# Patient Record
Sex: FEMALE | URBAN - METROPOLITAN AREA
[De-identification: names, ages, dates, MRNs, and addresses within clinical notes are randomized per-mention and may not be internally consistent; named-entity substitution may affect disease eponyms.]

---

## 2023-03-29 ENCOUNTER — CLINICAL DOCUMENTATION (OUTPATIENT)
Dept: ONCOLOGY | Age: 23
End: 2023-03-29

## 2023-03-29 ENCOUNTER — CLINICAL DOCUMENTATION (OUTPATIENT)
Facility: HOSPITAL | Age: 23
End: 2023-03-29

## 2023-03-29 NOTE — PROGRESS NOTES
I spoke [place] Courser regarding insurance options. I advised the patient [employer] is out-of-network for [employer]. Patient stated she will pay out of pocket regarding expenses for my medical treatment and services.

## 2023-04-05 DIAGNOSIS — D64.9 ANEMIA, UNSPECIFIED TYPE: Primary | ICD-10-CM

## 2023-04-05 DIAGNOSIS — Z13.0 SCREENING, IRON DEFICIENCY ANEMIA: ICD-10-CM

## 2023-04-06 ENCOUNTER — CLINICAL DOCUMENTATION (OUTPATIENT)
Dept: ONCOLOGY | Age: 23
End: 2023-04-06

## 2023-04-06 ENCOUNTER — HOSPITAL ENCOUNTER (OUTPATIENT)
Dept: LAB | Age: 23
Discharge: HOME | End: 2023-04-06

## 2023-04-06 DIAGNOSIS — Z13.0 SCREENING, IRON DEFICIENCY ANEMIA: ICD-10-CM

## 2023-04-06 DIAGNOSIS — D64.9 ANEMIA, UNSPECIFIED TYPE: ICD-10-CM

## 2023-04-06 LAB
ALBUMIN SERPL-MCNC: 4.2 G/DL (ref 3.5–5)
ALBUMIN/GLOB SERPL: 1.4 (ref 0.4–1.6)
ALP SERPL-CCNC: 66 U/L (ref 50–136)
ALT SERPL-CCNC: 75 U/L (ref 12–65)
ANION GAP SERPL CALC-SCNC: 6 MMOL/L (ref 2–11)
APPEARANCE UR: ABNORMAL
AST SERPL-CCNC: 184 U/L (ref 15–37)
BACTERIA URNS QL MICRO: ABNORMAL /HPF
BASOPHILS # BLD: 0 K/UL (ref 0–0.2)
BASOPHILS NFR BLD: 0 % (ref 0–2)
BILIRUB SERPL-MCNC: 1 MG/DL (ref 0.2–1.1)
BILIRUB UR QL: ABNORMAL
BUN SERPL-MCNC: 9 MG/DL (ref 6–23)
CALCIUM SERPL-MCNC: 8.9 MG/DL (ref 8.3–10.4)
CASTS URNS QL MICRO: ABNORMAL /LPF
CEA SERPL-MCNC: 3.8 NG/ML (ref 0–3)
CHLORIDE SERPL-SCNC: 106 MMOL/L (ref 101–110)
CO2 SERPL-SCNC: 28 MMOL/L (ref 21–32)
COLOR UR: YELLOW
CREAT SERPL-MCNC: 0.5 MG/DL (ref 0.6–1)
CRYSTALS URNS QL MICRO: 0 /LPF
DAT POLY-SP REAG RBC QL: NORMAL
DIFFERENTIAL METHOD BLD: ABNORMAL
EOSINOPHIL # BLD: 0.1 K/UL (ref 0–0.8)
EOSINOPHIL NFR BLD: 2 % (ref 0.5–7.8)
EPI CELLS #/AREA URNS HPF: ABNORMAL /HPF
ERYTHROCYTE [SEDIMENTATION RATE] IN BLOOD: 4 MM/HR (ref 0–20)
FERRITIN SERPL-MCNC: 599 NG/ML (ref 8–388)
FOLATE SERPL-MCNC: 5.9 NG/ML (ref 3.1–17.5)
GLOBULIN SER CALC-MCNC: 3 G/DL (ref 2.8–4.5)
GLUCOSE SERPL-MCNC: 83 MG/DL (ref 65–100)
GLUCOSE UR STRIP.AUTO-MCNC: NEGATIVE MG/DL
HCT VFR BLD AUTO: 21.8 % (ref 35.8–46.3)
HGB BLD-MCNC: 7.4 G/DL (ref 11.7–15.4)
HGB RETIC QN AUTO: 39 PG (ref 29–35)
HGB UR QL STRIP: ABNORMAL
IMM GRANULOCYTES # BLD AUTO: 0.1 K/UL (ref 0–0.5)
IMM GRANULOCYTES NFR BLD AUTO: 1 % (ref 0–5)
IMM RETICS NFR: 7.4 % (ref 3–15.9)
IRON SATN MFR SERPL: 71 %
IRON SERPL-MCNC: 157 UG/DL (ref 35–150)
KETONES UR QL STRIP.AUTO: NEGATIVE MG/DL
LDH SERPL L TO P-CCNC: >4000 U/L (ref 100–190)
LEUKOCYTE ESTERASE UR QL STRIP.AUTO: NEGATIVE
LYMPHOCYTES # BLD: 2.4 K/UL (ref 0.5–4.6)
LYMPHOCYTES NFR BLD: 47 % (ref 13–44)
MCH RBC QN AUTO: 33.8 PG (ref 26.1–32.9)
MCHC RBC AUTO-ENTMCNC: 33.9 G/DL (ref 31.4–35)
MCV RBC AUTO: 99.5 FL (ref 82–102)
MONOCYTES # BLD: 0.1 K/UL (ref 0.1–1.3)
MONOCYTES NFR BLD: 2 % (ref 4–12)
MUCOUS THREADS URNS QL MICRO: ABNORMAL /LPF
NEUTS SEG # BLD: 2.5 K/UL (ref 1.7–8.2)
NEUTS SEG NFR BLD: 48 % (ref 43–78)
NITRITE UR QL STRIP.AUTO: NEGATIVE
NRBC # BLD: 0 K/UL (ref 0–0.2)
PERIPHERAL SMEAR, MD REVIEW: NORMAL
PH UR STRIP: 5.5 (ref 5–9)
PHOSPHATE SERPL-MCNC: 3.9 MG/DL (ref 2.5–4.5)
PLATELET # BLD AUTO: 222 K/UL (ref 150–450)
PLATELET COMMENT: ADEQUATE
PMV BLD AUTO: 11.8 FL (ref 9.4–12.3)
POTASSIUM SERPL-SCNC: 3.2 MMOL/L (ref 3.5–5.1)
PROT SERPL-MCNC: 7.2 G/DL (ref 6.3–8.2)
PROT UR STRIP-MCNC: 100 MG/DL
RBC # BLD AUTO: 2.19 M/UL (ref 4.05–5.2)
RBC #/AREA URNS HPF: ABNORMAL /HPF
RBC MORPH BLD: ABNORMAL
RETICS # AUTO: 0.01 M/UL (ref 0.03–0.1)
RETICS/RBC NFR AUTO: 0.6 % (ref 0.3–2)
SODIUM SERPL-SCNC: 140 MMOL/L (ref 133–143)
SP GR UR REFRACTOMETRY: >=1.03 (ref 1–1.02)
T4 FREE SERPL-MCNC: 1 NG/DL (ref 0.78–1.4)
TIBC SERPL-MCNC: 222 UG/DL (ref 250–450)
URATE SERPL-MCNC: 3.1 MG/DL (ref 2.6–6)
UROBILINOGEN UR QL STRIP.AUTO: 1 EU/DL
WBC # BLD AUTO: 5.2 K/UL (ref 4.3–11.1)
WBC MORPH BLD: ABNORMAL
WBC URNS QL MICRO: ABNORMAL /HPF

## 2023-04-06 PROCEDURE — 36415 COLL VENOUS BLD VENIPUNCTURE: CPT

## 2023-04-06 PROCEDURE — 83020 HEMOGLOBIN ELECTROPHORESIS: CPT

## 2023-04-06 PROCEDURE — 85025 COMPLETE CBC W/AUTO DIFF WBC: CPT

## 2023-04-06 PROCEDURE — 82728 ASSAY OF FERRITIN: CPT

## 2023-04-06 PROCEDURE — 84439 ASSAY OF FREE THYROXINE: CPT

## 2023-04-06 PROCEDURE — 80074 ACUTE HEPATITIS PANEL: CPT

## 2023-04-06 PROCEDURE — 82746 ASSAY OF FOLIC ACID SERUM: CPT

## 2023-04-06 PROCEDURE — 86880 COOMBS TEST DIRECT: CPT

## 2023-04-06 PROCEDURE — 80053 COMPREHEN METABOLIC PANEL: CPT

## 2023-04-06 PROCEDURE — 84100 ASSAY OF PHOSPHORUS: CPT

## 2023-04-06 PROCEDURE — 85652 RBC SED RATE AUTOMATED: CPT

## 2023-04-06 PROCEDURE — 84238 ASSAY NONENDOCRINE RECEPTOR: CPT

## 2023-04-06 PROCEDURE — 81001 URINALYSIS AUTO W/SCOPE: CPT

## 2023-04-06 PROCEDURE — 87389 HIV-1 AG W/HIV-1&-2 AB AG IA: CPT

## 2023-04-06 PROCEDURE — 83540 ASSAY OF IRON: CPT

## 2023-04-06 PROCEDURE — 86038 ANTINUCLEAR ANTIBODIES: CPT

## 2023-04-06 PROCEDURE — 84550 ASSAY OF BLOOD/URIC ACID: CPT

## 2023-04-06 PROCEDURE — 83615 LACTATE (LD) (LDH) ENZYME: CPT

## 2023-04-06 PROCEDURE — 85046 RETICYTE/HGB CONCENTRATE: CPT

## 2023-04-06 PROCEDURE — 82378 CARCINOEMBRYONIC ANTIGEN: CPT

## 2023-04-06 NOTE — PROGRESS NOTES
I spoke with Joshua Givens regarding insurance. Patient stated she was enrolled into the [facility] by an .

## 2023-04-06 NOTE — PROGRESS NOTES
free fluid or free air are visualized. Vasculature: The abdominal aorta is non-aneurysmal.   Lymph nodes: Unremarkable. No enlarged lymph nodes. Urinary bladder: The bladder contains no calculi. Reproductive: In the right adnexa there is a simple appearing cyst with an attenuation of 16 Hounsfield units measuring 6 x 6 x 6.3 cm. There is extrinsic mass effect along the right lateral wall of the bladder. Bones/joints: No acute bony abnormality is identified. Soft tissues: Unremarkable. IMPRESSION:   1. 6 cm simple appearing right adnexal cyst.  Further evaluation with prompt non-emergent ultrasound is recommended to characterize. (Reference: Abe)   2. Findings in the chest are reported separately. CT CHEST WITH CONTRAST; DIAGNOSTIC 2/15/23  FINDINGS:   Tubes, catheters and devices: There appears to be a catheter extending from the right upper extremity, but difficult to evaluate due to hyperdense contrast bolus obscuring the region. Thyroid: A nodule in the right thyroid lobe measures 8 mm. Lungs: No acute infiltrates or pulmonary nodules are visualized. Pleural spaces: Unremarkable. No pneumothorax. No pleural effusion. Heart: Unremarkable. No cardiomegaly. No pericardial effusion. Mediastinal space: Triangular shaped soft tissue density in the anterior mediastinum as visualized on image 16, series 2 is most likely secondary to residual thymic tissue. Lymph nodes: Unremarkable. No enlarged lymph nodes. Vasculature: The thoracic aorta is non-aneurysmal.   Bones/joints: Unremarkable. No acute fracture. Soft tissues: Deep in the right breast adjacent the chest wall there is an oval nodule measuring 2 x 1.6 cm on image 24, series 2. There is also an oval-shaped structure with a calcification or small focus of enhancement along the lateral aspect of the right axilla. This measures 1.9 x 1.1 cm and extends up to the skin surface. It is visualized on image 17 of series 2.    IMPRESSION:

## 2023-04-07 PROBLEM — D59.30 HEMOLYTIC-UREMIC SYNDROME (HCC): Status: ACTIVE | Noted: 2023-04-07

## 2023-04-07 PROBLEM — D64.9 ANEMIA: Status: ACTIVE | Noted: 2023-04-07

## 2023-04-07 LAB — ANA SER QL: NEGATIVE

## 2023-04-09 LAB
HAV IGM SER QL: NONREACTIVE
HBV CORE IGM SER QL: NONREACTIVE
HBV SURFACE AG SER QL: NONREACTIVE
HCV AB SER QL: NONREACTIVE
HIV 1+2 AB+HIV1 P24 AG SERPL QL IA: NONREACTIVE
HIV 1/2 RESULT COMMENT: NORMAL

## 2023-04-17 ENCOUNTER — CLINICAL DOCUMENTATION (OUTPATIENT)
Dept: ONCOLOGY | Age: 23
End: 2023-04-17

## 2023-04-19 DIAGNOSIS — D59.30 HEMOLYTIC-UREMIC SYNDROME, UNSPECIFIED SUBTYPE (HCC): ICD-10-CM

## 2023-04-19 DIAGNOSIS — D64.9 ANEMIA, UNSPECIFIED TYPE: Primary | ICD-10-CM

## 2023-04-20 ENCOUNTER — OFFICE VISIT (OUTPATIENT)
Dept: ONCOLOGY | Age: 23
End: 2023-04-20

## 2023-04-20 ENCOUNTER — HOSPITAL ENCOUNTER (OUTPATIENT)
Dept: LAB | Age: 23
Discharge: HOME | End: 2023-04-20

## 2023-04-20 ENCOUNTER — CLINICAL DOCUMENTATION (OUTPATIENT)
Dept: ONCOLOGY | Age: 23
End: 2023-04-20

## 2023-04-20 VITALS
WEIGHT: 154 LBS | DIASTOLIC BLOOD PRESSURE: 70 MMHG | OXYGEN SATURATION: 100 % | BODY MASS INDEX: 25.66 KG/M2 | HEIGHT: 65 IN | HEART RATE: 86 BPM | TEMPERATURE: 98.8 F | SYSTOLIC BLOOD PRESSURE: 119 MMHG | RESPIRATION RATE: 16 BRPM

## 2023-04-20 DIAGNOSIS — Z87.892 HX OF ANAPHYLAXIS: ICD-10-CM

## 2023-04-20 DIAGNOSIS — D59.30 HEMOLYTIC-UREMIC SYNDROME, UNSPECIFIED SUBTYPE (HCC): ICD-10-CM

## 2023-04-20 DIAGNOSIS — R59.1 LAD (LYMPHADENOPATHY): ICD-10-CM

## 2023-04-20 DIAGNOSIS — R11.2 NAUSEA AND VOMITING, UNSPECIFIED VOMITING TYPE: ICD-10-CM

## 2023-04-20 DIAGNOSIS — R16.1 SPLENOMEGALY: ICD-10-CM

## 2023-04-20 DIAGNOSIS — D64.9 ANEMIA, UNSPECIFIED TYPE: Primary | ICD-10-CM

## 2023-04-20 DIAGNOSIS — D64.9 ANEMIA, UNSPECIFIED TYPE: ICD-10-CM

## 2023-04-20 LAB
ALBUMIN SERPL-MCNC: 4.2 G/DL (ref 3.5–5)
ALBUMIN/GLOB SERPL: 1.4 (ref 0.4–1.6)
ALP SERPL-CCNC: 65 U/L (ref 50–136)
ALT SERPL-CCNC: 49 U/L (ref 12–65)
ANION GAP SERPL CALC-SCNC: 6 MMOL/L (ref 2–11)
AST SERPL-CCNC: 93 U/L (ref 15–37)
BASOPHILS # BLD: 0 K/UL (ref 0–0.2)
BASOPHILS NFR BLD: 0 % (ref 0–2)
BILIRUB SERPL-MCNC: 1.4 MG/DL (ref 0.2–1.1)
BUN SERPL-MCNC: 9 MG/DL (ref 6–23)
CALCIUM SERPL-MCNC: 8.7 MG/DL (ref 8.3–10.4)
CHLORIDE SERPL-SCNC: 108 MMOL/L (ref 101–110)
CO2 SERPL-SCNC: 27 MMOL/L (ref 21–32)
CREAT SERPL-MCNC: 0.5 MG/DL (ref 0.6–1)
DIFFERENTIAL METHOD BLD: ABNORMAL
EOSINOPHIL # BLD: 0.1 K/UL (ref 0–0.8)
EOSINOPHIL NFR BLD: 2 % (ref 0.5–7.8)
GLOBULIN SER CALC-MCNC: 3 G/DL (ref 2.8–4.5)
GLUCOSE SERPL-MCNC: 86 MG/DL (ref 65–100)
HCT VFR BLD AUTO: 19.8 % (ref 35.8–46.3)
HGB BLD-MCNC: 6.6 G/DL (ref 11.7–15.4)
HGB RETIC QN AUTO: 33 PG (ref 29–35)
IMM GRANULOCYTES # BLD AUTO: 0.1 K/UL (ref 0–0.5)
IMM GRANULOCYTES NFR BLD AUTO: 2 % (ref 0–5)
IMM RETICS NFR: 73.6 % (ref 3–15.9)
LYMPHOCYTES # BLD: 3.4 K/UL (ref 0.5–4.6)
LYMPHOCYTES NFR BLD: 47 % (ref 13–44)
MCH RBC QN AUTO: 33.7 PG (ref 26.1–32.9)
MCHC RBC AUTO-ENTMCNC: 33.3 G/DL (ref 31.4–35)
MCV RBC AUTO: 101 FL (ref 82–102)
MONOCYTES # BLD: 0.4 K/UL (ref 0.1–1.3)
MONOCYTES NFR BLD: 5 % (ref 4–12)
NEUTS SEG # BLD: 3.1 K/UL (ref 1.7–8.2)
NEUTS SEG NFR BLD: 44 % (ref 43–78)
NRBC # BLD: 0.23 K/UL (ref 0–0.2)
PLATELET # BLD AUTO: 184 K/UL (ref 150–450)
PLATELET COMMENT: ADEQUATE
PMV BLD AUTO: 12.2 FL (ref 9.4–12.3)
POTASSIUM SERPL-SCNC: 2.9 MMOL/L (ref 3.5–5.1)
PROT SERPL-MCNC: 7.2 G/DL (ref 6.3–8.2)
RBC # BLD AUTO: 1.96 M/UL (ref 4.05–5.2)
RBC MORPH BLD: ABNORMAL
RETICS # AUTO: 0.1 M/UL (ref 0.03–0.1)
RETICS/RBC NFR AUTO: 5.1 % (ref 0.3–2)
SODIUM SERPL-SCNC: 141 MMOL/L (ref 133–143)
WBC # BLD AUTO: 7.1 K/UL (ref 4.3–11.1)
WBC MORPH BLD: ABNORMAL

## 2023-04-20 PROCEDURE — 36415 COLL VENOUS BLD VENIPUNCTURE: CPT

## 2023-04-20 PROCEDURE — 80053 COMPREHEN METABOLIC PANEL: CPT

## 2023-04-20 PROCEDURE — 99417 PROLNG OP E/M EACH 15 MIN: CPT | Performed by: PEDIATRICS

## 2023-04-20 PROCEDURE — 85046 RETICYTE/HGB CONCENTRATE: CPT

## 2023-04-20 PROCEDURE — 99215 OFFICE O/P EST HI 40 MIN: CPT | Performed by: PEDIATRICS

## 2023-04-20 PROCEDURE — 85025 COMPLETE CBC W/AUTO DIFF WBC: CPT

## 2023-04-20 PROCEDURE — 86880 COOMBS TEST DIRECT: CPT

## 2023-04-20 RX ORDER — ONDANSETRON HYDROCHLORIDE 8 MG/1
8 TABLET, FILM COATED ORAL EVERY 8 HOURS PRN
Qty: 60 TABLET | Refills: 0 | Status: SHIPPED | OUTPATIENT
Start: 2023-04-20

## 2023-04-20 ASSESSMENT — PATIENT HEALTH QUESTIONNAIRE - PHQ9
SUM OF ALL RESPONSES TO PHQ QUESTIONS 1-9: 1
SUM OF ALL RESPONSES TO PHQ QUESTIONS 1-9: 1
1. LITTLE INTEREST OR PLEASURE IN DOING THINGS: 0
SUM OF ALL RESPONSES TO PHQ QUESTIONS 1-9: 1
2. FEELING DOWN, DEPRESSED OR HOPELESS: 1
SUM OF ALL RESPONSES TO PHQ9 QUESTIONS 1 & 2: 1
SUM OF ALL RESPONSES TO PHQ QUESTIONS 1-9: 1

## 2023-04-20 NOTE — PROGRESS NOTES
-Not able to tolerate zpak (vomiting). Received ~3 days per report. Continued cough. -weight loss. Vomiting multiple times per day/not able to keep food/medication down.   -states taking prednisone and Protonix. States is only been able to take Prednisone 20mg by mouth daily instead of 30mg by mouth twice a day as prescribed due to vomiting. MD updated.
Admission line notified of planned direct admit on 4-25-23 to [facility] 5th floor. Need labs at [facility] prior to admission. Orders placed. Pt aware to wait for call prior to coming for admission. Inpatient teams notified.
past 365 days. [facility] Results (most recent):  No results found for this or any previous visit from the past 365 days. US  No results found for this or any previous visit from the past 365 days. Patient Active Problem List   Diagnosis    Hemolytic-uremic syndrome ([facility] [address]    Anemia         ASSESSMENT and PLAN  23 yo with a chronic autoimmune hemolytic anemia referred by Dr. Padilla Maya for second opinion and assistance with management and a history of non compliance, difficulty with medications and here with mother who is engaged in her care. It appears to be a warm antibody even though the verona was negative, as she responds to steroids per history and report although she did have a transfusion reaction and improved with warming the blood. Prior work up for Cold agglutinin disease, [address] (gume landsteiner) and bm disorder were performed and negative. PNH was negative. ALPS not performed. Pt. Currently with insurance not covered and they have already switched over insurances so hold on further work up until this is resolved. Once it is, then recommend the following work up:    Repeat Verona and Super Verona to Gramovox labs ([facility])  -consider repeating Cold agglutinins and Cathalene Blazer AB  -Copper and cereluplasmin for bryan disease (consider MRI brain)  -abd US and CXR (splenomegaly, lesions, LAD)  -RBC enzyme studies ([place]) if antibody repeat work up is negative, pt. 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  Reports does not tolerate prednisone, but sounds like acid reflux so recommend protonix and then increase to 30 BID x 5 days then decrease,

## 2023-04-20 NOTE — PATIENT INSTRUCTIONS
Patient Instructions from Today's Visit    Reason for Visit:    Follow up for anemia   2nd opinion per [facility] (Dr Keya Justin)     Hx: dizzy spells, headaches, Anemia starting in 2022  Hx Blood transfusion x 2     Hx pineal cyst (untreated)      Hx frequent infections/sicknesses (Starting in ~7th grade)     Hx Autoimmune urticaria (highschool)      S/p bone marrow aspirate/biopsy     Current symptoms: nausea/vomiting, ~30 pound weight loss, fatigue      Currently prescribed Prednisone but pt states she is not taking. Upcoming Rheumatology consult: fall 2023       Plan: Your hemoglobin is lower than last time. (6.6)  Due to your fatigue, recent fall, and level of anemia, you are at the point of a blood transfusion. Discussed admission to the hospital this week or next week:  -GI consult (repeat scopes)  -repeat bone marrow (sedated)   -IVIG   -possible blood transfusion    Schedule zofran 8mg by mouth twice a day 30 min prior to prednisone doses. Reviewed in detail. Restart Prednisone 30mg by mouth twice a day if tolerated. Continue Protonix. Call for worsening symptoms or go to the ER. Follow Up:  Tuesday for lab, office visit and then go to [facility] for admission once bed is available.     Recent Lab Results:  Hospital Outpatient Visit on 04/20/2023   Component Date Value Ref Range Status    WBC 04/20/2023 7.1  4.3 - 11.1 K/uL Final    PERIPHERAL REVIEW TO FOLLOW    RBC 04/20/2023 1.96 (L)  4.05 - 5.2 M/uL Final    Hemoglobin 04/20/2023 6.6 (LL)  11.7 - 15.4 g/dL Final    Comment: RESULTS VERIFIED, PHONED TO AND READ BACK BY  SANJUANA HUANG RN @7320 ON 04/20/2023 BY JAGJIT      Hematocrit 04/20/2023 19.8 (L)  35.8 - 46.3 % Final    MCV 04/20/2023 101.0  82.0 - 102.0 FL Final    MCH 04/20/2023 33.7 (H)  26.1 - 32.9 PG Final    MCHC 04/20/2023 33.3  31.4 - 35.0 g/dL Final    Platelets 76/36/5709 184  150 - 450 K/uL Final    MPV 04/20/2023 12.2  9.4 - 12.3 FL Final    nRBC

## 2023-04-21 LAB
Lab: NORMAL
Lab: NORMAL
REFERENCE LAB: NORMAL

## 2023-04-24 ENCOUNTER — TELEPHONE (OUTPATIENT)
Dept: ONCOLOGY | Age: 23
End: 2023-04-24

## 2023-04-24 DIAGNOSIS — R11.2 NAUSEA AND VOMITING, UNSPECIFIED VOMITING TYPE: ICD-10-CM

## 2023-04-24 DIAGNOSIS — D64.9 ANEMIA, UNSPECIFIED TYPE: Primary | ICD-10-CM

## 2023-04-24 DIAGNOSIS — R16.1 SPLENOMEGALY: ICD-10-CM

## 2023-04-24 DIAGNOSIS — D59.30 HEMOLYTIC-UREMIC SYNDROME, UNSPECIFIED SUBTYPE (HCC): ICD-10-CM

## 2023-04-24 NOTE — TELEPHONE ENCOUNTER
Call to patient to inquire what is needed. She states that she was feeling bad earlier and she couldn't get through on the phones. She reached out to [facility] and they were going to have her do labs but she decided she would wait and get them done here tomorrow. She then asked if she is supposed to come here first or does she need to go to the ER. I reviewed chart and patient with MD appointment and labs first, then she is to be direct admitted.  She appreciated the call back

## 2023-04-25 ENCOUNTER — CLINICAL DOCUMENTATION (OUTPATIENT)
Dept: ONCOLOGY | Age: 23
End: 2023-04-25

## 2023-04-25 ENCOUNTER — OFFICE VISIT (OUTPATIENT)
Dept: ONCOLOGY | Age: 23
End: 2023-04-25
Payer: COMMERCIAL

## 2023-04-25 ENCOUNTER — HOSPITAL ENCOUNTER (OUTPATIENT)
Dept: LAB | Age: 23
Discharge: HOME | End: 2023-04-28
Payer: COMMERCIAL

## 2023-04-25 VITALS
HEART RATE: 74 BPM | BODY MASS INDEX: 26.39 KG/M2 | HEIGHT: 65 IN | SYSTOLIC BLOOD PRESSURE: 114 MMHG | WEIGHT: 158.4 LBS | TEMPERATURE: 99.3 F | OXYGEN SATURATION: 100 % | RESPIRATION RATE: 16 BRPM | DIASTOLIC BLOOD PRESSURE: 70 MMHG

## 2023-04-25 DIAGNOSIS — D64.9 ANEMIA, UNSPECIFIED TYPE: ICD-10-CM

## 2023-04-25 DIAGNOSIS — Z87.892 HX OF ANAPHYLAXIS: ICD-10-CM

## 2023-04-25 DIAGNOSIS — R16.1 SPLENOMEGALY: ICD-10-CM

## 2023-04-25 DIAGNOSIS — R59.1 LAD (LYMPHADENOPATHY): ICD-10-CM

## 2023-04-25 DIAGNOSIS — R11.2 NAUSEA AND VOMITING, UNSPECIFIED VOMITING TYPE: ICD-10-CM

## 2023-04-25 DIAGNOSIS — D64.9 ANEMIA, UNSPECIFIED TYPE: Primary | ICD-10-CM

## 2023-04-25 PROBLEM — D59.10 AIHA (AUTOIMMUNE HEMOLYTIC ANEMIA) (HCC): Status: ACTIVE | Noted: 2023-04-25

## 2023-04-25 LAB
ABO + RH BLD: NORMAL
ALBUMIN SERPL-MCNC: 3.9 G/DL (ref 3.5–5)
ALBUMIN/GLOB SERPL: 1.4 (ref 0.4–1.6)
ALP SERPL-CCNC: 77 U/L (ref 50–136)
ALT SERPL-CCNC: 27 U/L (ref 12–65)
ANION GAP SERPL CALC-SCNC: 5 MMOL/L (ref 2–11)
AST SERPL-CCNC: 10 U/L (ref 15–37)
BASOPHILS # BLD: 0 K/UL (ref 0–0.2)
BASOPHILS NFR BLD: 0 % (ref 0–2)
BILIRUB SERPL-MCNC: 1 MG/DL (ref 0.2–1.1)
BLOOD GROUP ANTIBODIES SERPL: NORMAL
BUN SERPL-MCNC: 7 MG/DL (ref 6–23)
CALCIUM SERPL-MCNC: 8.5 MG/DL (ref 8.3–10.4)
CHLORIDE SERPL-SCNC: 112 MMOL/L (ref 101–110)
CO2 SERPL-SCNC: 27 MMOL/L (ref 21–32)
CREAT SERPL-MCNC: 0.6 MG/DL (ref 0.6–1)
DIFFERENTIAL METHOD BLD: ABNORMAL
EOSINOPHIL # BLD: 0.1 K/UL (ref 0–0.8)
EOSINOPHIL NFR BLD: 2 % (ref 0.5–7.8)
ERYTHROCYTE [DISTWIDTH] IN BLOOD BY AUTOMATED COUNT: 21.8 % (ref 11.9–14.6)
GLOBULIN SER CALC-MCNC: 2.7 G/DL (ref 2.8–4.5)
GLUCOSE SERPL-MCNC: 87 MG/DL (ref 65–100)
HCT VFR BLD AUTO: 24.9 % (ref 35.8–46.3)
HGB BLD-MCNC: 7.6 G/DL (ref 11.7–15.4)
IMM GRANULOCYTES # BLD AUTO: 0 K/UL (ref 0–0.5)
IMM GRANULOCYTES NFR BLD AUTO: 0 % (ref 0–5)
LYMPHOCYTES # BLD: 2.6 K/UL (ref 0.5–4.6)
LYMPHOCYTES NFR BLD: 45 % (ref 13–44)
Lab: NORMAL
MCH RBC QN AUTO: 32.1 PG (ref 26.1–32.9)
MCHC RBC AUTO-ENTMCNC: 30.5 G/DL (ref 31.4–35)
MCV RBC AUTO: 105.1 FL (ref 82–102)
MONOCYTES # BLD: 0.6 K/UL (ref 0.1–1.3)
MONOCYTES NFR BLD: 10 % (ref 4–12)
NEUTS SEG # BLD: 2.5 K/UL (ref 1.7–8.2)
NEUTS SEG NFR BLD: 43 % (ref 43–78)
NRBC # BLD: 0.02 K/UL (ref 0–0.2)
PLATELET # BLD AUTO: 319 K/UL (ref 150–450)
PMV BLD AUTO: 11.4 FL (ref 9.4–12.3)
POTASSIUM SERPL-SCNC: 3.2 MMOL/L (ref 3.5–5.1)
PROT SERPL-MCNC: 6.6 G/DL (ref 6.3–8.2)
RBC # BLD AUTO: 2.37 M/UL (ref 4.05–5.2)
REFERENCE LAB: NORMAL
SODIUM SERPL-SCNC: 144 MMOL/L (ref 133–143)
SPECIMEN EXP DATE BLD: NORMAL
WBC # BLD AUTO: 5.7 K/UL (ref 4.3–11.1)

## 2023-04-25 PROCEDURE — 86161 COMPLEMENT/FUNCTION ACTIVITY: CPT

## 2023-04-25 PROCEDURE — 86900 BLOOD TYPING SEROLOGIC ABO: CPT

## 2023-04-25 PROCEDURE — 86157 COLD AGGLUTININ TITER: CPT

## 2023-04-25 PROCEDURE — 86200 CCP ANTIBODY: CPT

## 2023-04-25 PROCEDURE — 80053 COMPREHEN METABOLIC PANEL: CPT

## 2023-04-25 PROCEDURE — 86430 RHEUMATOID FACTOR TEST QUAL: CPT

## 2023-04-25 PROCEDURE — 86160 COMPLEMENT ANTIGEN: CPT

## 2023-04-25 PROCEDURE — 82784 ASSAY IGA/IGD/IGG/IGM EACH: CPT

## 2023-04-25 PROCEDURE — 86941 HEMOLYSINS/AGGLUTININS: CPT

## 2023-04-25 PROCEDURE — 85025 COMPLETE CBC W/AUTO DIFF WBC: CPT

## 2023-04-25 PROCEDURE — 83915 ASSAY OF NUCLEOTIDASE: CPT

## 2023-04-25 PROCEDURE — 82525 ASSAY OF COPPER: CPT

## 2023-04-25 PROCEDURE — 84220 ASSAY OF PYRUVATE KINASE: CPT

## 2023-04-25 PROCEDURE — 86940 HEMOLYSINS/AGGLUTININS AUTO: CPT

## 2023-04-25 PROCEDURE — 86901 BLOOD TYPING SEROLOGIC RH(D): CPT

## 2023-04-25 PROCEDURE — 82955 ASSAY OF G6PD ENZYME: CPT

## 2023-04-25 PROCEDURE — 99215 OFFICE O/P EST HI 40 MIN: CPT | Performed by: PEDIATRICS

## 2023-04-25 PROCEDURE — 82657 ENZYME CELL ACTIVITY: CPT

## 2023-04-25 PROCEDURE — 86850 RBC ANTIBODY SCREEN: CPT

## 2023-04-25 PROCEDURE — 82785 ASSAY OF IGE: CPT

## 2023-04-25 PROCEDURE — 82390 ASSAY OF CERULOPLASMIN: CPT

## 2023-04-25 PROCEDURE — 36415 COLL VENOUS BLD VENIPUNCTURE: CPT

## 2023-04-25 PROCEDURE — 87798 DETECT AGENT NOS DNA AMP: CPT

## 2023-04-25 ASSESSMENT — PATIENT HEALTH QUESTIONNAIRE - PHQ9
1. LITTLE INTEREST OR PLEASURE IN DOING THINGS: 0
2. FEELING DOWN, DEPRESSED OR HOPELESS: 1
SUM OF ALL RESPONSES TO PHQ QUESTIONS 1-9: 1
SUM OF ALL RESPONSES TO PHQ9 QUESTIONS 1 & 2: 1

## 2023-04-25 NOTE — PROGRESS NOTES
Goran Akhtar [facility] 100 Insurance verified for patient. Effective date 4/1/2023-12/31/2023.

## 2023-04-25 NOTE — PATIENT INSTRUCTIONS
Patient Instructions from Today's Visit    Reason for Visit:  Follow up for anemia     Hx: dizzy spells, headaches, Anemia starting in 2022  Hx Blood transfusion x 2     Hx pineal cyst (untreated)      Hx frequent infections/sicknesses (Starting in ~7th grade)     Hx Autoimmune urticaria (highschool)      S/p bone marrow aspirate/biopsy     Current symptoms: nausea/vomiting, ~30 pound weight loss, fatigue      Currently prescribed Prednisone but pt states she is not taking. Upcoming Rheumatology consult: fall 2023       Plan:  Admission today was planned at [facility].  Since your hemoglobin is starting to improve/you are getting your medications, you are not vomiting anymore, 2 pound weight gain, and you don't want to be admitted, we will transition this to outpatient work up. Hold off on IVIG and blood transfusion. Hold off on repeat bone marrow aspirate/biopsy. Referral to GI here in [place]. Continue Protonix daily, zofran twice a day, Prednisone 30mg by mouth twice a day until we see you back. We will set you up for Rituxamab outpatient as we had talked a couple visits ago. We are glad your nausea/vomiting has improved since last visit/since starting zofran twice a day last week. Follow Up:  Next week       Recent Lab Results:      Treatment Summary has been discussed and given to patient: NA        -------------------------------------------------------------------------------------------------------------------  Please call our office at [phone] if you have any  of the following symptoms:   Fever of 100.5 or greater  Chills  Shortness of breath  Swelling or pain in one leg    After office hours an answering service is available and will contact a provider for emergencies or if you are experiencing any of the above symptoms. Patient has My Chart. My Chart log in information explained on the after visit summary printout at the [address] desk.

## 2023-04-26 ENCOUNTER — TELEPHONE (OUTPATIENT)
Dept: ONCOLOGY | Age: 23
End: 2023-04-26

## 2023-04-26 LAB
CCP IGA+IGG SERPL IA-ACNC: 3 UNITS (ref 0–19)
COPPER SERPL-MCNC: 90 UG/DL (ref 80–158)
RHEUMATOID FACT SER QL LA: NEGATIVE

## 2023-04-26 NOTE — TELEPHONE ENCOUNTER
Darin message from pt asked for someone to call pt back. Called pt who was unavailable. Spoke to pt's mom. She states that yesterday on the way home pt got nauseated which prompted her [employer] message. Reviewed outpatient plan. Mom comfortable with plan. Reviewed to call for worsening symptoms or seek emergent care if needed. She verbalized understanding.

## 2023-04-26 NOTE — PROGRESS NOTES
HISTORY OF PRESENT ILLNESS  Dangelo Tyson is a 24 y.o. y.o. female with hemolytic anemia    ABSTRACT  Reason for Referral: Severe anemia     Referring Provider: Cyrus Garcia MD     Primary Care Provider: CHHAYA Mckinney NP     Family History of Cancer/Hematologic Disorders: None documented. Presenting Symptoms: Chronic shortness of breath, fatigue, chest pain, weakness, dizziness, headaches, nosebleeds, inguinal lymphadenopathy, nausea, vomiting, weight loss, and decreased appetite     Narrative with recent with Results/Procedures/Biopsies and Dates completed: Ms. Neda Nguyen is a 44-year-old black female with a history of pineal gland cyst, migraines, auto immune disease, cholecystectomy, and orthopedic surgery. She was evaluated in consultation by Dr. Baron Baldwin at [facility] on 8/9/22 for severe macrocytic anemia with HGB of 7.3. She reported symptoms of chronic shortness of breath, fatigue, chest pain, weakness, dizziness, headaches, nosebleeds, nausea, vomiting, weight loss, and decreased appetite. She denied any major bleeding and reported irregular menstrual periods. Extensive work-up for anemia was initiated. Labs showed positive urobilinogen, high bilirubin, high LDH, and low haptoglobin. She was started on 60 mg Prednisone for suspected hemolysis. PNH was negative, cold agglutinin was negative, direct Kimberly negative, multiple myeloma negative, hepatitis panel negative, HIV negative. Platelets and white count were normal. JESSICA was positive. CT of the abdomen and pelvis with contrast on 8/22/22 showed no major evidence of abnormalities in the liver or spleen or lymphadenopathy. Dr. Stokes Fairly noted that patient was most likely suffering from an autoimmune hemolytic anemia. With prednisone, patient's LDH, retic count, and bilirubin normalized; hemoglobin improved; and macrocytosis resolved. Prednisone was tapered down and then stopped in November of 2022.       Patient was lost to William Viramontes  NEUROSURGERY  [address] [place] [address], [facility] [address]  [place] [address]  Phone: [phone]  Fax: [phone]  Follow Up Time:

## 2023-04-27 LAB
C1INH FUNCTIONAL/C1INH TOTAL MFR SERPL: 90 %MEAN NORMAL
C1INH SERPL-MCNC: 17 MG/DL (ref 21–39)
IGE SERPL-ACNC: <2 IU/ML (ref 6–495)

## 2023-04-28 LAB
CA TITR SERPL AGGL: NEGATIVE
CERULOPLASMIN SERPL-MCNC: 18.9 MG/DL (ref 19–39)
IGA SERPL-MCNC: 180 MG/DL (ref 87–352)
IGG SERPL-MCNC: 799 MG/DL (ref 586–1602)
IGM SERPL-MCNC: 75 MG/DL (ref 26–217)

## 2023-04-30 LAB — B19V DNA SPEC QL NAA+PROBE: NEGATIVE

## 2023-05-02 DIAGNOSIS — R59.1 LAD (LYMPHADENOPATHY): ICD-10-CM

## 2023-05-02 DIAGNOSIS — D59.10 AIHA (AUTOIMMUNE HEMOLYTIC ANEMIA) (HCC): ICD-10-CM

## 2023-05-02 DIAGNOSIS — D64.9 ANEMIA, UNSPECIFIED TYPE: Primary | ICD-10-CM

## 2023-05-02 DIAGNOSIS — R11.2 NAUSEA AND VOMITING, UNSPECIFIED VOMITING TYPE: ICD-10-CM

## 2023-05-02 DIAGNOSIS — R16.1 SPLENOMEGALY: ICD-10-CM

## 2023-05-04 ENCOUNTER — OFFICE VISIT (OUTPATIENT)
Dept: ONCOLOGY | Age: 23
End: 2023-05-04
Payer: COMMERCIAL

## 2023-05-04 ENCOUNTER — HOSPITAL ENCOUNTER (OUTPATIENT)
Dept: LAB | Age: 23
Discharge: HOME | End: 2023-05-04
Payer: COMMERCIAL

## 2023-05-04 ENCOUNTER — HOSPITAL ENCOUNTER (OUTPATIENT)
Dept: INFUSION THERAPY | Age: 23
Discharge: HOME | End: 2023-05-04

## 2023-05-04 VITALS — WEIGHT: 154.1 LBS | HEIGHT: 65 IN | BODY MASS INDEX: 25.67 KG/M2

## 2023-05-04 DIAGNOSIS — R16.1 SPLENOMEGALY: ICD-10-CM

## 2023-05-04 DIAGNOSIS — D59.10 AIHA (AUTOIMMUNE HEMOLYTIC ANEMIA) (HCC): ICD-10-CM

## 2023-05-04 DIAGNOSIS — D64.9 ANEMIA, UNSPECIFIED TYPE: ICD-10-CM

## 2023-05-04 DIAGNOSIS — Z86.19 HISTORY OF EPSTEIN-BARR VIRUS INFECTION: ICD-10-CM

## 2023-05-04 DIAGNOSIS — R59.1 LAD (LYMPHADENOPATHY): ICD-10-CM

## 2023-05-04 DIAGNOSIS — D59.10 AIHA (AUTOIMMUNE HEMOLYTIC ANEMIA) (HCC): Primary | ICD-10-CM

## 2023-05-04 DIAGNOSIS — B37.9 YEAST INFECTION: ICD-10-CM

## 2023-05-04 DIAGNOSIS — Z87.892 HX OF ANAPHYLAXIS: ICD-10-CM

## 2023-05-04 DIAGNOSIS — R53.83 FATIGUE, UNSPECIFIED TYPE: ICD-10-CM

## 2023-05-04 DIAGNOSIS — R11.2 NAUSEA AND VOMITING, UNSPECIFIED VOMITING TYPE: ICD-10-CM

## 2023-05-04 LAB
ALBUMIN SERPL-MCNC: 4.6 G/DL (ref 3.5–5)
ALBUMIN/GLOB SERPL: 1.6 (ref 0.4–1.6)
ALP SERPL-CCNC: 86 U/L (ref 50–136)
ALT SERPL-CCNC: 26 U/L (ref 12–65)
ANION GAP SERPL CALC-SCNC: 2 MMOL/L (ref 2–11)
APTT PPP: 30.3 SEC (ref 24.5–34.2)
AST SERPL-CCNC: 23 U/L (ref 15–37)
BASOPHILS # BLD: 0 K/UL (ref 0–0.2)
BASOPHILS NFR BLD: 0 % (ref 0–2)
BILIRUB SERPL-MCNC: 1.3 MG/DL (ref 0.2–1.1)
BUN SERPL-MCNC: 9 MG/DL (ref 6–23)
CALCIUM SERPL-MCNC: 9.2 MG/DL (ref 8.3–10.4)
CHLORIDE SERPL-SCNC: 111 MMOL/L (ref 101–110)
CO2 SERPL-SCNC: 27 MMOL/L (ref 21–32)
CREAT SERPL-MCNC: 0.5 MG/DL (ref 0.6–1)
DIFFERENTIAL METHOD BLD: ABNORMAL
EOSINOPHIL # BLD: 0 K/UL (ref 0–0.8)
EOSINOPHIL NFR BLD: 0 % (ref 0.5–7.8)
ERYTHROCYTE [DISTWIDTH] IN BLOOD BY AUTOMATED COUNT: 22.1 % (ref 11.9–14.6)
FIBRINOGEN PPP-MCNC: 255 MG/DL (ref 190–501)
GLOBULIN SER CALC-MCNC: 2.9 G/DL (ref 2.8–4.5)
GLUCOSE SERPL-MCNC: 112 MG/DL (ref 65–100)
HCT VFR BLD AUTO: 29.1 % (ref 35.8–46.3)
HGB BLD-MCNC: 9 G/DL (ref 11.7–15.4)
IMM GRANULOCYTES # BLD AUTO: 0 K/UL (ref 0–0.5)
IMM GRANULOCYTES NFR BLD AUTO: 0 % (ref 0–5)
INR PPP: 1.1
LYMPHOCYTES # BLD: 0.8 K/UL (ref 0.5–4.6)
LYMPHOCYTES NFR BLD: 8 % (ref 13–44)
Lab: NORMAL
MCH RBC QN AUTO: 32.3 PG (ref 26.1–32.9)
MCHC RBC AUTO-ENTMCNC: 30.9 G/DL (ref 31.4–35)
MCV RBC AUTO: 104.3 FL (ref 82–102)
MONOCYTES # BLD: 0.1 K/UL (ref 0.1–1.3)
MONOCYTES NFR BLD: 1 % (ref 4–12)
NEUTS SEG # BLD: 8.5 K/UL (ref 1.7–8.2)
NEUTS SEG NFR BLD: 91 % (ref 43–78)
NRBC # BLD: 0 K/UL (ref 0–0.2)
PLATELET # BLD AUTO: 877 K/UL (ref 150–450)
PMV BLD AUTO: 9.7 FL (ref 9.4–12.3)
POTASSIUM SERPL-SCNC: 4.2 MMOL/L (ref 3.5–5.1)
PROT SERPL-MCNC: 7.5 G/DL (ref 6.3–8.2)
PROTHROMBIN TIME: 14.9 SEC (ref 12.6–14.3)
RBC # BLD AUTO: 2.79 M/UL (ref 4.05–5.2)
REFERENCE LAB: NORMAL
SODIUM SERPL-SCNC: 140 MMOL/L (ref 133–143)
THROMBIN TIME: 15.5 SECS (ref 15.4–17.9)
WBC # BLD AUTO: 9.4 K/UL (ref 4.3–11.1)

## 2023-05-04 PROCEDURE — 85384 FIBRINOGEN ACTIVITY: CPT

## 2023-05-04 PROCEDURE — 80053 COMPREHEN METABOLIC PANEL: CPT

## 2023-05-04 PROCEDURE — 4004F PT TOBACCO SCREEN RCVD TLK: CPT | Performed by: PEDIATRICS

## 2023-05-04 PROCEDURE — 81479 UNLISTED MOLECULAR PATHOLOGY: CPT

## 2023-05-04 PROCEDURE — 86665 EPSTEIN-BARR CAPSID VCA: CPT

## 2023-05-04 PROCEDURE — 88184 FLOWCYTOMETRY/ TC 1 MARKER: CPT

## 2023-05-04 PROCEDURE — 36415 COLL VENOUS BLD VENIPUNCTURE: CPT

## 2023-05-04 PROCEDURE — 85730 THROMBOPLASTIN TIME PARTIAL: CPT

## 2023-05-04 PROCEDURE — 85670 THROMBIN TIME PLASMA: CPT

## 2023-05-04 PROCEDURE — G8419 CALC BMI OUT NRM PARAM NOF/U: HCPCS | Performed by: PEDIATRICS

## 2023-05-04 PROCEDURE — 85610 PROTHROMBIN TIME: CPT

## 2023-05-04 PROCEDURE — 88188 FLOWCYTOMETRY/READ 9-15: CPT

## 2023-05-04 PROCEDURE — 99215 OFFICE O/P EST HI 40 MIN: CPT | Performed by: PEDIATRICS

## 2023-05-04 PROCEDURE — G8428 CUR MEDS NOT DOCUMENT: HCPCS | Performed by: PEDIATRICS

## 2023-05-04 PROCEDURE — 85025 COMPLETE CBC W/AUTO DIFF WBC: CPT

## 2023-05-04 PROCEDURE — 88185 FLOWCYTOMETRY/TC ADD-ON: CPT

## 2023-05-04 RX ORDER — FLUCONAZOLE 200 MG/1
200 TABLET ORAL DAILY
Qty: 30 TABLET | Refills: 0 | Status: SHIPPED | OUTPATIENT
Start: 2023-05-04

## 2023-05-04 RX ORDER — PREDNISONE 10 MG/1
TABLET ORAL
Qty: 60 TABLET | Refills: 0 | Status: SHIPPED | OUTPATIENT
Start: 2023-05-04

## 2023-05-04 NOTE — PATIENT INSTRUCTIONS
Patient Instructions from Today's Visit    Reason for Visit:  Follow up for anemia     Hx: dizzy spells, headaches, Anemia starting in 2022  Hx Blood transfusion x 2     Hx pineal cyst (untreated)      Hx frequent infections/sicknesses (Starting in ~7th grade)     Hx Autoimmune urticaria (highschool)      S/p bone marrow aspirate/biopsy     Currently taking:   Prednisone 30mg by mouth twice a day   Protonix twice a day   Zofran twice a day      Upcoming Rheumatology consult: fall 2023  Upcoming GI consult: (referral placed April 2023)        Plan: We are so glad your cough, nausea, vomiting are improving! The IV therapy: Rituxan scheduled for today was not approved by your insurance. Sometimes the steroids can cause a yeast infection. You may have an early start of that in your mouth. We will start you on an antifungal called Diflucan 200mg by mouth daily to try to treat that. Please do not change your steroids unless we direct you. You have already changed the Prednisone from 30mg twice a day to 20mg twice a day on your own. We will instruct you on a slow wean/decrease. Continue 20mg by mouth twice a day for 1 year. Then decrease to 10mg by mouth twice a day for 2 weeks. We will see you around the end of that time (~3weeks from now)           Follow Up:  ~3 weeks    Recent Lab Results:      Treatment Summary has been discussed and given to patient: NA        -------------------------------------------------------------------------------------------------------------------  Please call our office at [phone] if you have any  of the following symptoms:   Fever of 100.5 or greater  Chills  Shortness of breath  Swelling or pain in one leg    After office hours an answering service is available and will contact a provider for emergencies or if you are experiencing any of the above symptoms. Patient has My Chart.   My Chart log in information explained on the after visit summary printout at

## 2023-05-04 NOTE — PROGRESS NOTES
HISTORY OF PRESENT ILLNESS  Mame Guzman is a 24 y.o. y.o. female with hemolytic anemia    ABSTRACT  Reason for Referral: Severe anemia     Referring Provider: May Rodriguez MD     Primary Care Provider: CHHAYA Munoz NP     Family History of Cancer/Hematologic Disorders: None documented. Presenting Symptoms: Chronic shortness of breath, fatigue, chest pain, weakness, dizziness, headaches, nosebleeds, inguinal lymphadenopathy, nausea, vomiting, weight loss, and decreased appetite     Narrative with recent with Results/Procedures/Biopsies and Dates completed: Ms. Anyi Castro is a 21-year-old black female with a history of pineal gland cyst, migraines, auto immune disease, cholecystectomy, and orthopedic surgery. She was evaluated in consultation by Dr. Hunter Herron at [facility] on 8/9/22 for severe macrocytic anemia with HGB of 7.3. She reported symptoms of chronic shortness of breath, fatigue, chest pain, weakness, dizziness, headaches, nosebleeds, nausea, vomiting, weight loss, and decreased appetite. She denied any major bleeding and reported irregular menstrual periods. Extensive work-up for anemia was initiated. Labs showed positive urobilinogen, high bilirubin, high LDH, and low haptoglobin. She was started on 60 mg Prednisone for suspected hemolysis. PNH was negative, cold agglutinin was negative, direct Kimberly negative, multiple myeloma negative, hepatitis panel negative, HIV negative. Platelets and white count were normal. JESSICA was positive. CT of the abdomen and pelvis with contrast on 8/22/22 showed no major evidence of abnormalities in the liver or spleen or lymphadenopathy. Dr. Gillian Zurita noted that patient was most likely suffering from an autoimmune hemolytic anemia. With prednisone, patient's LDH, retic count, and bilirubin normalized; hemoglobin improved; and macrocytosis resolved. Prednisone was tapered down and then stopped in November of 2022.       Patient was lost to

## 2023-05-05 LAB
EBV VCA IGG SER-ACNC: <18 U/ML (ref 0–17.9)
EBV VCA IGM SER-ACNC: <36 U/ML (ref 0–35.9)
Lab: NORMAL
REFERENCE LAB: NORMAL

## 2023-05-10 LAB
Lab: NORMAL
Lab: NORMAL
REFERENCE LAB: NORMAL

## 2023-05-19 DIAGNOSIS — D64.9 ANEMIA, UNSPECIFIED TYPE: Primary | ICD-10-CM

## 2023-05-19 DIAGNOSIS — Z79.52 CURRENT USE OF STEROID MEDICATION: ICD-10-CM

## 2023-05-19 DIAGNOSIS — R16.1 SPLENOMEGALY: ICD-10-CM

## 2023-05-25 ENCOUNTER — TELEPHONE (OUTPATIENT)
Dept: ONCOLOGY | Age: 23
End: 2023-05-25

## 2023-05-31 LAB
Lab: NORMAL
Lab: NORMAL
REFERENCE LAB: NORMAL

## 2023-06-15 ENCOUNTER — HOSPITAL ENCOUNTER (OUTPATIENT)
Dept: LAB | Age: 23
Discharge: HOME | End: 2023-06-18
Payer: COMMERCIAL

## 2023-06-15 DIAGNOSIS — Z79.52 CURRENT USE OF STEROID MEDICATION: ICD-10-CM

## 2023-06-15 DIAGNOSIS — D64.9 ANEMIA, UNSPECIFIED TYPE: ICD-10-CM

## 2023-06-15 DIAGNOSIS — R16.1 SPLENOMEGALY: ICD-10-CM

## 2023-06-15 LAB
ALBUMIN SERPL-MCNC: 4.3 G/DL (ref 3.5–5)
ALBUMIN/GLOB SERPL: 1.6 (ref 0.4–1.6)
ALP SERPL-CCNC: 79 U/L (ref 50–136)
ALT SERPL-CCNC: 117 U/L (ref 12–65)
ANION GAP SERPL CALC-SCNC: 5 MMOL/L (ref 2–11)
AST SERPL-CCNC: 174 U/L (ref 15–37)
BASOPHILS # BLD: 0 K/UL (ref 0–0.2)
BASOPHILS NFR BLD: 0 % (ref 0–2)
BILIRUB SERPL-MCNC: 0.9 MG/DL (ref 0.2–1.1)
BUN SERPL-MCNC: 7 MG/DL (ref 6–23)
CALCIUM SERPL-MCNC: 8.9 MG/DL (ref 8.3–10.4)
CHLORIDE SERPL-SCNC: 109 MMOL/L (ref 101–110)
CO2 SERPL-SCNC: 29 MMOL/L (ref 21–32)
CREAT SERPL-MCNC: 0.6 MG/DL (ref 0.6–1)
DIFFERENTIAL METHOD BLD: ABNORMAL
EOSINOPHIL # BLD: 0.1 K/UL (ref 0–0.8)
EOSINOPHIL NFR BLD: 2 % (ref 0.5–7.8)
ERYTHROCYTE [DISTWIDTH] IN BLOOD BY AUTOMATED COUNT: 23.8 % (ref 11.9–14.6)
GLOBULIN SER CALC-MCNC: 2.7 G/DL (ref 2.8–4.5)
GLUCOSE SERPL-MCNC: 90 MG/DL (ref 65–100)
HCT VFR BLD AUTO: 22.8 % (ref 35.8–46.3)
HGB BLD-MCNC: 7.6 G/DL (ref 11.7–15.4)
IMM GRANULOCYTES # BLD AUTO: 0 K/UL (ref 0–0.5)
IMM GRANULOCYTES NFR BLD AUTO: 0 % (ref 0–5)
LYMPHOCYTES # BLD: 2.2 K/UL (ref 0.5–4.6)
LYMPHOCYTES NFR BLD: 51 % (ref 13–44)
MCH RBC QN AUTO: 33.2 PG (ref 26.1–32.9)
MCHC RBC AUTO-ENTMCNC: 33.3 G/DL (ref 31.4–35)
MCV RBC AUTO: 99.6 FL (ref 82–102)
MONOCYTES # BLD: 0.1 K/UL (ref 0.1–1.3)
MONOCYTES NFR BLD: 2 % (ref 4–12)
NEUTS SEG # BLD: 2 K/UL (ref 1.7–8.2)
NEUTS SEG NFR BLD: 45 % (ref 43–78)
NRBC # BLD: 0 K/UL (ref 0–0.2)
PLATELET # BLD AUTO: 209 K/UL (ref 150–450)
PLATELET COMMENT: ADEQUATE
PMV BLD AUTO: 12 FL (ref 9.4–12.3)
POTASSIUM SERPL-SCNC: 3.6 MMOL/L (ref 3.5–5.1)
PROT SERPL-MCNC: 7 G/DL (ref 6.3–8.2)
RBC # BLD AUTO: 2.29 M/UL (ref 4.05–5.2)
RBC MORPH BLD: ABNORMAL
SODIUM SERPL-SCNC: 143 MMOL/L (ref 133–143)
WBC # BLD AUTO: 4.4 K/UL (ref 4.3–11.1)
WBC MORPH BLD: ABNORMAL

## 2023-06-15 PROCEDURE — 85025 COMPLETE CBC W/AUTO DIFF WBC: CPT

## 2023-06-15 PROCEDURE — 36415 COLL VENOUS BLD VENIPUNCTURE: CPT

## 2023-06-15 PROCEDURE — 80053 COMPREHEN METABOLIC PANEL: CPT

## 2023-06-28 ENCOUNTER — CLINICAL DOCUMENTATION (OUTPATIENT)
Dept: ONCOLOGY | Age: 23
End: 2023-06-28

## 2023-06-28 ENCOUNTER — OFFICE VISIT (OUTPATIENT)
Dept: ONCOLOGY | Age: 23
End: 2023-06-28

## 2023-06-28 ENCOUNTER — HOSPITAL ENCOUNTER (OUTPATIENT)
Dept: LAB | Age: 23
Discharge: HOME | End: 2023-07-01
Payer: COMMERCIAL

## 2023-06-28 VITALS
HEART RATE: 101 BPM | TEMPERATURE: 98.7 F | SYSTOLIC BLOOD PRESSURE: 110 MMHG | BODY MASS INDEX: 26.16 KG/M2 | RESPIRATION RATE: 14 BRPM | DIASTOLIC BLOOD PRESSURE: 77 MMHG | OXYGEN SATURATION: 100 % | HEIGHT: 65 IN | WEIGHT: 157 LBS

## 2023-06-28 DIAGNOSIS — D59.10 AIHA (AUTOIMMUNE HEMOLYTIC ANEMIA) (HCC): ICD-10-CM

## 2023-06-28 DIAGNOSIS — D59.10 AIHA (AUTOIMMUNE HEMOLYTIC ANEMIA) (HCC): Primary | ICD-10-CM

## 2023-06-28 DIAGNOSIS — R53.83 FATIGUE, UNSPECIFIED TYPE: ICD-10-CM

## 2023-06-28 LAB
ALBUMIN SERPL-MCNC: 4.1 G/DL (ref 3.5–5)
ALBUMIN/GLOB SERPL: 1.4 (ref 0.4–1.6)
ALP SERPL-CCNC: 66 U/L (ref 50–136)
ALT SERPL-CCNC: 92 U/L (ref 12–65)
ANION GAP SERPL CALC-SCNC: 5 MMOL/L (ref 2–11)
AST SERPL-CCNC: 224 U/L (ref 15–37)
BASOPHILS # BLD: 0 K/UL (ref 0–0.2)
BASOPHILS NFR BLD: 0 % (ref 0–2)
BILIRUB SERPL-MCNC: 1 MG/DL (ref 0.2–1.1)
BUN SERPL-MCNC: 9 MG/DL (ref 6–23)
CALCIUM SERPL-MCNC: 8.7 MG/DL (ref 8.3–10.4)
CHLORIDE SERPL-SCNC: 108 MMOL/L (ref 101–110)
CO2 SERPL-SCNC: 28 MMOL/L (ref 21–32)
CREAT SERPL-MCNC: 0.5 MG/DL (ref 0.6–1)
DIFFERENTIAL METHOD BLD: ABNORMAL
EOSINOPHIL # BLD: 0.1 K/UL (ref 0–0.8)
EOSINOPHIL NFR BLD: 1 % (ref 0.5–7.8)
ERYTHROCYTE [DISTWIDTH] IN BLOOD BY AUTOMATED COUNT: 24.8 % (ref 11.9–14.6)
GLOBULIN SER CALC-MCNC: 2.9 G/DL (ref 2.8–4.5)
GLUCOSE SERPL-MCNC: 83 MG/DL (ref 65–100)
HCT VFR BLD AUTO: 20.7 % (ref 35.8–46.3)
HGB BLD-MCNC: 6.9 G/DL (ref 11.7–15.4)
HGB RETIC QN AUTO: 38 PG (ref 29–35)
IMM GRANULOCYTES # BLD AUTO: 0.1 K/UL (ref 0–0.5)
IMM GRANULOCYTES NFR BLD AUTO: 1 % (ref 0–5)
IMM RETICS NFR: 10.5 % (ref 3–15.9)
LYMPHOCYTES # BLD: 2.6 K/UL (ref 0.5–4.6)
LYMPHOCYTES NFR BLD: 44 % (ref 13–44)
MCH RBC QN AUTO: 32.5 PG (ref 26.1–32.9)
MCHC RBC AUTO-ENTMCNC: 33.3 G/DL (ref 31.4–35)
MCV RBC AUTO: 97.6 FL (ref 82–102)
MONOCYTES # BLD: 0.1 K/UL (ref 0.1–1.3)
MONOCYTES NFR BLD: 1 % (ref 4–12)
NEUTS SEG # BLD: 3 K/UL (ref 1.7–8.2)
NEUTS SEG NFR BLD: 53 % (ref 43–78)
NRBC # BLD: 0 K/UL (ref 0–0.2)
PLATELET # BLD AUTO: 283 K/UL (ref 150–450)
PLATELET COMMENT: ADEQUATE
PMV BLD AUTO: 11.4 FL (ref 9.4–12.3)
POTASSIUM SERPL-SCNC: 3.4 MMOL/L (ref 3.5–5.1)
PROT SERPL-MCNC: 7 G/DL (ref 6.3–8.2)
RBC # BLD AUTO: 2.12 M/UL (ref 4.05–5.2)
RBC MORPH BLD: ABNORMAL
RETICS # AUTO: 0.01 M/UL (ref 0.03–0.1)
RETICS/RBC NFR AUTO: 0.4 % (ref 0.3–2)
SODIUM SERPL-SCNC: 141 MMOL/L (ref 133–143)
WBC # BLD AUTO: 5.9 K/UL (ref 4.3–11.1)
WBC MORPH BLD: ABNORMAL

## 2023-06-28 PROCEDURE — 99215 OFFICE O/P EST HI 40 MIN: CPT | Performed by: PEDIATRICS

## 2023-06-28 PROCEDURE — 80053 COMPREHEN METABOLIC PANEL: CPT

## 2023-06-28 PROCEDURE — 87389 HIV-1 AG W/HIV-1&-2 AB AG IA: CPT

## 2023-06-28 PROCEDURE — 36415 COLL VENOUS BLD VENIPUNCTURE: CPT

## 2023-06-28 PROCEDURE — 80074 ACUTE HEPATITIS PANEL: CPT

## 2023-06-28 PROCEDURE — 85025 COMPLETE CBC W/AUTO DIFF WBC: CPT

## 2023-06-28 PROCEDURE — 85046 RETICYTE/HGB CONCENTRATE: CPT

## 2023-06-28 ASSESSMENT — PATIENT HEALTH QUESTIONNAIRE - PHQ9
SUM OF ALL RESPONSES TO PHQ QUESTIONS 1-9: 0
2. FEELING DOWN, DEPRESSED OR HOPELESS: 0
SUM OF ALL RESPONSES TO PHQ QUESTIONS 1-9: 0

## 2023-07-05 ENCOUNTER — NURSE ONLY (OUTPATIENT)
Dept: ONCOLOGY | Age: 23
End: 2023-07-05

## 2023-07-05 DIAGNOSIS — R59.1 LAD (LYMPHADENOPATHY): ICD-10-CM

## 2023-07-05 DIAGNOSIS — R16.1 SPLENOMEGALY: ICD-10-CM

## 2023-07-05 DIAGNOSIS — D59.10 AIHA (AUTOIMMUNE HEMOLYTIC ANEMIA) (HCC): Primary | ICD-10-CM

## 2023-07-05 NOTE — PROGRESS NOTES
Per Dr Mckayla Olivier, cancel OV and admission for tomorrow. Schedule BMA/bx w NP outpatient in clinic. Admitting and IP teams made aware. Pt's mom updated and agreeable. She states she was admitted for 5 days last week in [place] and received IVIG.

## 2023-07-06 DIAGNOSIS — D59.10 AIHA (AUTOIMMUNE HEMOLYTIC ANEMIA) (HCC): Primary | ICD-10-CM

## 2023-07-06 NOTE — PROGRESS NOTES
Follow up w Rayray 7-13 with labs prior  Bma/bx w Nola 7-24 with labs prior   Follow up with Rayray 8-3 with labs prior

## 2023-07-13 ENCOUNTER — OFFICE VISIT (OUTPATIENT)
Dept: ONCOLOGY | Age: 23
End: 2023-07-13
Payer: COMMERCIAL

## 2023-07-13 ENCOUNTER — HOSPITAL ENCOUNTER (OUTPATIENT)
Dept: LAB | Age: 23
Discharge: HOME | End: 2023-07-13
Payer: COMMERCIAL

## 2023-07-13 ENCOUNTER — PREP FOR PROCEDURE (OUTPATIENT)
Dept: ONCOLOGY | Age: 23
End: 2023-07-13

## 2023-07-13 VITALS
WEIGHT: 159 LBS | DIASTOLIC BLOOD PRESSURE: 75 MMHG | SYSTOLIC BLOOD PRESSURE: 117 MMHG | OXYGEN SATURATION: 99 % | BODY MASS INDEX: 26.49 KG/M2 | TEMPERATURE: 97.7 F | HEIGHT: 65 IN | HEART RATE: 70 BPM | RESPIRATION RATE: 14 BRPM

## 2023-07-13 DIAGNOSIS — R53.83 FATIGUE, UNSPECIFIED TYPE: ICD-10-CM

## 2023-07-13 DIAGNOSIS — D64.9 ANEMIA, UNSPECIFIED TYPE: ICD-10-CM

## 2023-07-13 DIAGNOSIS — D59.10 AIHA (AUTOIMMUNE HEMOLYTIC ANEMIA) (HCC): Primary | ICD-10-CM

## 2023-07-13 DIAGNOSIS — R59.1 LAD (LYMPHADENOPATHY): ICD-10-CM

## 2023-07-13 DIAGNOSIS — R16.1 SPLENOMEGALY: ICD-10-CM

## 2023-07-13 DIAGNOSIS — D59.10 AIHA (AUTOIMMUNE HEMOLYTIC ANEMIA) (HCC): ICD-10-CM

## 2023-07-13 LAB
ABO + RH BLD: NORMAL
ALBUMIN SERPL-MCNC: 4 G/DL (ref 3.5–5)
ALBUMIN/GLOB SERPL: 1.1 (ref 0.4–1.6)
ALP SERPL-CCNC: 80 U/L (ref 50–136)
ALT SERPL-CCNC: 71 U/L (ref 12–65)
ANION GAP SERPL CALC-SCNC: 7 MMOL/L (ref 2–11)
AST SERPL-CCNC: 38 U/L (ref 15–37)
BASOPHILS # BLD: 0 K/UL (ref 0–0.2)
BASOPHILS NFR BLD: 0 % (ref 0–2)
BILIRUB SERPL-MCNC: 0.4 MG/DL (ref 0.2–1.1)
BLOOD GROUP ANTIBODIES SERPL: NORMAL
BUN SERPL-MCNC: 7 MG/DL (ref 6–23)
CALCIUM SERPL-MCNC: 8.9 MG/DL (ref 8.3–10.4)
CHLORIDE SERPL-SCNC: 109 MMOL/L (ref 101–110)
CO2 SERPL-SCNC: 27 MMOL/L (ref 21–32)
CREAT SERPL-MCNC: 0.6 MG/DL (ref 0.6–1)
DIFFERENTIAL METHOD BLD: ABNORMAL
EOSINOPHIL # BLD: 0.1 K/UL (ref 0–0.8)
EOSINOPHIL NFR BLD: 2 % (ref 0.5–7.8)
ERYTHROCYTE [DISTWIDTH] IN BLOOD BY AUTOMATED COUNT: 20.1 % (ref 11.9–14.6)
GLOBULIN SER CALC-MCNC: 3.6 G/DL (ref 2.8–4.5)
GLUCOSE SERPL-MCNC: 92 MG/DL (ref 65–100)
HCT VFR BLD AUTO: 35.4 % (ref 35.8–46.3)
HGB BLD-MCNC: 10.9 G/DL (ref 11.7–15.4)
HGB RETIC QN AUTO: 30 PG (ref 29–35)
IMM GRANULOCYTES # BLD AUTO: 0 K/UL (ref 0–0.5)
IMM GRANULOCYTES NFR BLD AUTO: 0 % (ref 0–5)
IMM RETICS NFR: 14.2 % (ref 3–15.9)
LDH SERPL L TO P-CCNC: 1331 U/L (ref 100–190)
LYMPHOCYTES # BLD: 2.2 K/UL (ref 0.5–4.6)
LYMPHOCYTES NFR BLD: 44 % (ref 13–44)
Lab: NORMAL
Lab: NORMAL
MCH RBC QN AUTO: 29.5 PG (ref 26.1–32.9)
MCHC RBC AUTO-ENTMCNC: 30.8 G/DL (ref 31.4–35)
MCV RBC AUTO: 95.9 FL (ref 82–102)
MONOCYTES # BLD: 0.6 K/UL (ref 0.1–1.3)
MONOCYTES NFR BLD: 11 % (ref 4–12)
NEUTS SEG # BLD: 2.2 K/UL (ref 1.7–8.2)
NEUTS SEG NFR BLD: 43 % (ref 43–78)
NRBC # BLD: 0 K/UL (ref 0–0.2)
PLATELET # BLD AUTO: 466 K/UL (ref 150–450)
PLATELET COMMENT: ADEQUATE
PMV BLD AUTO: 10.5 FL (ref 9.4–12.3)
POTASSIUM SERPL-SCNC: 3.5 MMOL/L (ref 3.5–5.1)
PROT SERPL-MCNC: 7.6 G/DL (ref 6.3–8.2)
RBC # BLD AUTO: 3.69 M/UL (ref 4.05–5.2)
RBC MORPH BLD: ABNORMAL
REFERENCE LAB: NORMAL
RETICS # AUTO: 0.19 M/UL (ref 0.03–0.1)
RETICS/RBC NFR AUTO: 5 % (ref 0.3–2)
SODIUM SERPL-SCNC: 143 MMOL/L (ref 133–143)
SPECIMEN EXP DATE BLD: NORMAL
WBC # BLD AUTO: 5.1 K/UL (ref 4.3–11.1)
WBC MORPH BLD: ABNORMAL

## 2023-07-13 PROCEDURE — G8428 CUR MEDS NOT DOCUMENT: HCPCS | Performed by: PEDIATRICS

## 2023-07-13 PROCEDURE — 1036F TOBACCO NON-USER: CPT | Performed by: PEDIATRICS

## 2023-07-13 PROCEDURE — 83615 LACTATE (LD) (LDH) ENZYME: CPT

## 2023-07-13 PROCEDURE — 80053 COMPREHEN METABOLIC PANEL: CPT

## 2023-07-13 PROCEDURE — 88184 FLOWCYTOMETRY/ TC 1 MARKER: CPT

## 2023-07-13 PROCEDURE — 86900 BLOOD TYPING SEROLOGIC ABO: CPT

## 2023-07-13 PROCEDURE — 99215 OFFICE O/P EST HI 40 MIN: CPT | Performed by: PEDIATRICS

## 2023-07-13 PROCEDURE — 36415 COLL VENOUS BLD VENIPUNCTURE: CPT

## 2023-07-13 PROCEDURE — 88185 FLOWCYTOMETRY/TC ADD-ON: CPT

## 2023-07-13 PROCEDURE — 85046 RETICYTE/HGB CONCENTRATE: CPT

## 2023-07-13 PROCEDURE — 85025 COMPLETE CBC W/AUTO DIFF WBC: CPT

## 2023-07-13 PROCEDURE — 86850 RBC ANTIBODY SCREEN: CPT

## 2023-07-13 PROCEDURE — 86360 T CELL ABSOLUTE COUNT/RATIO: CPT

## 2023-07-13 PROCEDURE — 88188 FLOWCYTOMETRY/READ 9-15: CPT

## 2023-07-13 PROCEDURE — G8419 CALC BMI OUT NRM PARAM NOF/U: HCPCS | Performed by: PEDIATRICS

## 2023-07-13 PROCEDURE — 86901 BLOOD TYPING SEROLOGIC RH(D): CPT

## 2023-07-13 ASSESSMENT — PATIENT HEALTH QUESTIONNAIRE - PHQ9
SUM OF ALL RESPONSES TO PHQ9 QUESTIONS 1 & 2: 0
SUM OF ALL RESPONSES TO PHQ QUESTIONS 1-9: 0
2. FEELING DOWN, DEPRESSED OR HOPELESS: 0
1. LITTLE INTEREST OR PLEASURE IN DOING THINGS: 0
SUM OF ALL RESPONSES TO PHQ QUESTIONS 1-9: 0

## 2023-07-13 NOTE — PATIENT INSTRUCTIONS
Patient Instructions from Today's Visit    Reason for Visit:  Follow up for anemia  S/p [facility] hospitalization  and administration of IVIG (July 2023)     Hx: Dizzy spells, headaches, Anemia starting in 2022  Hx: Blood transfusions     S/p bone marrow aspirate/biopsy  Scheduled for repeat BMA/bx - 7-24     Currently taking:   Prednisone 20mg by mouth once a day   Protonix twice a day   Folic Acid  Vitamin V00     Upcoming Rheumatology consult: fall 2023  Upcoming GI consult    Plan:    Repeat bone marrow aspirate/biopsy on 7-24-23 in the OR. I will work to get this scheduled and follow up with you on timing. Patient made aware of the following:   -NPO after midnight  -arrive 2 hrs prior to OR time for preop  -bring a  as you will not be allowed to drive home after anesthesia  -[address] ([facility])  -expect a phone assessment call prior to procedure date for further details    Continue Predinsone 20mg by mouth daily    We will see you the following week (8-3). When we see you back we will give you the first dose of Rituxan and will continue weekly x4 doses. We only need to see you on the day of your first Rituxan. We do not need to see you weekly x4. Follow Up:  As above    Recent Lab Results:      Treatment Summary has been discussed and given to patient: NA        -------------------------------------------------------------------------------------------------------------------  Please call our office at [phone] if you have any  of the following symptoms:   Fever of 100.5 or greater  Chills  Shortness of breath  Swelling or pain in one leg    After office hours an answering service is available and will contact a provider for emergencies or if you are experiencing any of the above symptoms. Patient has My Chart. My Chart log in information explained on the after visit summary printout at the [address] desk.

## 2023-07-13 NOTE — PROGRESS NOTES
Bone Marrow Aspirate/Biopsy:    Scheduled date/time: 7-24-23 at 1pm  Location: [facility] OR    Hematology aware:  yes  MD/AYA LS aware: yes  Patient aware: yes    Orders per Dr Wallace Lipoma: Routine studies  Orders placed:  yes    Patient made aware of the following:   -NPO after midnight  -arrive 2 hrs prior to OR time for preop  -bring a  as you will not be allowed to drive home after anesthesia  -[address] (outpatient building)  -expect a phone assessment call prior to procedure date for further details    All questions answered. Encouraged call back with questions/concerns.
Hemoglobin (10.9) reviewed with Dr Maisha Márquez. No changes to current plan.
testing  -send band. 3 to [facility] for HS, doubt  -outpatient work up:  ---[address] referral  --schedule rituximab, but held up due to [place]  --hold on marrow as plts recovering  --hold on IVIG and have time to get auth for ritux now  Insurance still concern, so did not do full lab work up  Did not tolerate zmax for chronic cough  With dropping hgb and clear hemolysis and h/o response to steroids, but not tolerating steroids now orally, pt needs to be admitted  -lungs better, able to hold down antiemetics/meds  -gum hyperemia, pain, ? Early thrust,  --diflucan 200mg daily  Monitor, see in 2-3 weeks    6/15/23  All work up negative, including supercoombs, ALPS, HAE, RBAC enzyme work up, Brendolyn Sequin but remains with transient transaminitis, hemolysis and anemia, responsive to steroids-agrees to full dose pred treatment with GI protection and follow up in 2 weeks  -pred 60 BID x 5 days, then 30mg BID x 9 days, follow up 2 weeks  -gi protection addressed  -if response (hgb>9-10) then assume immune mediated component and will wean steroids as able and then give ritux  -noted breast fibroadenoma (us pending) and negative CTA and mild benign right axillary firm rubbery s.c. 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 LDH, dec. Hapto, intermittent bili elevated c/w primarily extravascular hemolysis but components of both  -get repeat JESSICA (initial + and then - here)  -repeat PNH (initial low viability)  -Get EPO level  -babesiosis and ehrlichia titres  -admit next week after 4th for IVIG and obtain BMA/Bx given prior ?  Atypical and mild dysplasia  -likely imaging to
None

## 2023-07-14 LAB
BASOPHILS # BLD AUTO: 0 X10E3/UL (ref 0–0.2)
BASOPHILS NFR BLD AUTO: 0 %
CD3+CD4+ CELLS # BLD: 824 /UL (ref 359–1519)
CD3+CD4+ CELLS NFR BLD: 41.2 % (ref 30.8–58.5)
CD3+CD4+ CELLS/CD3+CD8+ CLL BLD: 1.06 (ref 0.92–3.72)
CD3+CD8+ CELLS # BLD: 776 /UL (ref 109–897)
CD3+CD8+ CELLS NFR BLD: 38.8 % (ref 12–35.5)
EOSINOPHIL # BLD AUTO: 0.1 X10E3/UL (ref 0–0.4)
EOSINOPHIL NFR BLD AUTO: 1 %
ERYTHROCYTE [DISTWIDTH] IN BLOOD BY AUTOMATED COUNT: 18.5 % (ref 11.7–15.4)
HCT VFR BLD AUTO: 34.4 % (ref 34–46.6)
HGB BLD-MCNC: 11.3 G/DL (ref 11.1–15.9)
IMM GRANULOCYTES # BLD: 0 X10E3/UL (ref 0–0.1)
IMM GRANULOCYTES NFR BLD: 0 %
LYMPHOCYTES # BLD AUTO: 2 X10E3/UL (ref 0.7–3.1)
LYMPHOCYTES NFR BLD AUTO: 43 %
MCH RBC QN AUTO: 30.7 PG (ref 26.6–33)
MCHC RBC AUTO-ENTMCNC: 32.8 G/DL (ref 31.5–35.7)
MCV RBC AUTO: 94 FL (ref 79–97)
MONOCYTES # BLD AUTO: 0.5 X10E3/UL (ref 0.1–0.9)
MONOCYTES NFR BLD AUTO: 11 %
NEUTROPHILS # BLD AUTO: 2 X10E3/UL (ref 1.4–7)
NEUTROPHILS NFR BLD AUTO: 45 %
PLATELET # BLD AUTO: 522 X10E3/UL (ref 150–450)
RBC # BLD AUTO: 3.68 X10E6/UL (ref 3.77–5.28)
WBC # BLD AUTO: 4.6 X10E3/UL (ref 3.4–10.8)

## 2023-07-17 RX ORDER — PREDNISONE 20 MG/1
20 TABLET ORAL DAILY
COMMUNITY

## 2023-07-17 NOTE — PERIOP NOTE
Patient verified name and . Order for consent not found in EHR; patient verifies procedure. Type 1B surgery, phone assessment complete. Orders not received. Labs per surgeon: none  Labs per anesthesia protocol: none (last labs 23; Hgb 11.3)    Patient answered medical/surgical history questions at their best of ability. All prior to admission medications documented in EPIC. Patient instructed to take the following medications the day of surgery according to anesthesia guidelines with a small sip of water: prednisone, protonix, zofran if needed On the day before surgery please take Acetaminophen 1000mg in the morning and then again before bed. You may substitute for Tylenol 650 mg. Hold all vitamins 7 days prior to surgery and NSAIDS 5 days prior to surgery. Prescription meds to hold:none  Patient instructed on the following:    > Arrive at [facility], time of arrival to be called the day before by 1700  > NPO after midnight, unless otherwise indicated, including gum, mints, and ice chips  > Responsible adult must drive patient to the hospital, stay during surgery, and patient will need supervision 24 hours after anesthesia  > Use Antibacterial soap   in shower the night before surgery and on the morning of surgery  > All piercings must be removed prior to arrival.    > Leave all valuables (money and jewelry) at home but bring insurance card and ID on DOS.   > You may be required to pay a deductible or co-pay on the day of your procedure. You can pre-pay by calling [phone] if your surgery is at the [facility] or [phone] if your surgery is at the [facility]. > Do not wear make-up, nail polish, lotions, cologne, perfumes, powders, or oil on skin. Artificial nails are not permitted.

## 2023-07-19 LAB
Lab: NORMAL
Lab: NORMAL
REFERENCE LAB: NORMAL

## 2023-07-21 NOTE — PERIOP NOTE
Preop department called to notify patient of arrival time for scheduled procedure. Instructions given to   - Arrive at [address] [facility]. - Remain NPO after midnight, unless otherwise indicated, including gum, mints, and ice chips. - Have a responsible adult to drive patient to the hospital, stay during surgery, and patient will need supervision 24 hours after anesthesia. - Use antibacterial soap in shower the night before surgery and on the morning of surgery.        Was patient contacted: pt  Voicemail left:   Numbers contacted: [phone]   Arrival time: 1100

## 2023-07-23 ENCOUNTER — ANESTHESIA EVENT (OUTPATIENT)
Dept: SURGERY | Age: 23
End: 2023-07-23

## 2023-07-24 ENCOUNTER — ANESTHESIA (OUTPATIENT)
Dept: SURGERY | Age: 23
End: 2023-07-24

## 2023-07-24 ENCOUNTER — HOSPITAL ENCOUNTER (OUTPATIENT)
Age: 23
Setting detail: OUTPATIENT SURGERY
Discharge: HOME | End: 2023-07-24
Attending: PEDIATRICS | Admitting: PEDIATRICS

## 2023-07-24 VITALS
OXYGEN SATURATION: 98 % | HEART RATE: 64 BPM | HEIGHT: 65 IN | BODY MASS INDEX: 26.16 KG/M2 | RESPIRATION RATE: 12 BRPM | SYSTOLIC BLOOD PRESSURE: 108 MMHG | WEIGHT: 157 LBS | TEMPERATURE: 99.7 F | DIASTOLIC BLOOD PRESSURE: 72 MMHG

## 2023-07-24 LAB
BASOPHILS # BLD: 0.1 K/UL (ref 0–0.2)
BASOPHILS NFR BLD: 2 % (ref 0–2)
BONE MARROW PREP & WRIGHT STAIN: NORMAL
DIFFERENTIAL METHOD BLD: ABNORMAL
EOSINOPHIL # BLD: 0.1 K/UL (ref 0–0.8)
EOSINOPHIL NFR BLD: 2 % (ref 0.5–7.8)
ERYTHROCYTE [DISTWIDTH] IN BLOOD BY AUTOMATED COUNT: 18.5 % (ref 11.9–14.6)
HCG UR QL: NEGATIVE
HCT VFR BLD AUTO: 36.9 % (ref 35.8–46.3)
HGB BLD-MCNC: 11.6 G/DL (ref 11.7–15.4)
IMM GRANULOCYTES # BLD AUTO: 0 K/UL (ref 0–0.5)
IMM GRANULOCYTES NFR BLD AUTO: 0 % (ref 0–5)
LYMPHOCYTES # BLD: 2.7 K/UL (ref 0.5–4.6)
LYMPHOCYTES NFR BLD: 51 % (ref 13–44)
MCH RBC QN AUTO: 30.2 PG (ref 26.1–32.9)
MCHC RBC AUTO-ENTMCNC: 31.4 G/DL (ref 31.4–35)
MCV RBC AUTO: 96.1 FL (ref 82–102)
MONOCYTES # BLD: 0.6 K/UL (ref 0.1–1.3)
MONOCYTES NFR BLD: 11 % (ref 4–12)
NEUTS SEG # BLD: 1.8 K/UL (ref 1.7–8.2)
NEUTS SEG NFR BLD: 34 % (ref 43–78)
NRBC # BLD: 0 K/UL (ref 0–0.2)
PLATELET # BLD AUTO: 523 K/UL (ref 150–450)
PMV BLD AUTO: 10.3 FL (ref 9.4–12.3)
RBC # BLD AUTO: 3.84 M/UL (ref 4.05–5.2)
WBC # BLD AUTO: 5.4 K/UL (ref 4.3–11.1)

## 2023-07-24 PROCEDURE — 7100000010 HC PHASE II RECOVERY - FIRST 15 MIN: Performed by: PEDIATRICS

## 2023-07-24 PROCEDURE — 3700000001 HC ADD 15 MINUTES (ANESTHESIA): Performed by: PEDIATRICS

## 2023-07-24 PROCEDURE — 3600000012 HC SURGERY LEVEL 2 ADDTL 15MIN: Performed by: PEDIATRICS

## 2023-07-24 PROCEDURE — 85025 COMPLETE CBC W/AUTO DIFF WBC: CPT

## 2023-07-24 PROCEDURE — 3700000000 HC ANESTHESIA ATTENDED CARE: Performed by: PEDIATRICS

## 2023-07-24 PROCEDURE — 3600000002 HC SURGERY LEVEL 2 BASE: Performed by: PEDIATRICS

## 2023-07-24 PROCEDURE — 81025 URINE PREGNANCY TEST: CPT

## 2023-07-24 PROCEDURE — 88313 SPECIAL STAINS GROUP 2: CPT

## 2023-07-24 PROCEDURE — 7100000001 HC PACU RECOVERY - ADDTL 15 MIN: Performed by: PEDIATRICS

## 2023-07-24 PROCEDURE — 38221 DX BONE MARROW BIOPSIES: CPT | Performed by: PEDIATRICS

## 2023-07-24 PROCEDURE — 2580000003 HC RX 258: Performed by: STUDENT IN AN ORGANIZED HEALTH CARE EDUCATION/TRAINING PROGRAM

## 2023-07-24 PROCEDURE — 2709999900 HC NON-CHARGEABLE SUPPLY: Performed by: PEDIATRICS

## 2023-07-24 PROCEDURE — 88305 TISSUE EXAM BY PATHOLOGIST: CPT

## 2023-07-24 PROCEDURE — 88311 DECALCIFY TISSUE: CPT

## 2023-07-24 PROCEDURE — 6360000002 HC RX W HCPCS: Performed by: REGISTERED NURSE

## 2023-07-24 PROCEDURE — 6360000002 HC RX W HCPCS: Performed by: STUDENT IN AN ORGANIZED HEALTH CARE EDUCATION/TRAINING PROGRAM

## 2023-07-24 PROCEDURE — 7100000000 HC PACU RECOVERY - FIRST 15 MIN: Performed by: PEDIATRICS

## 2023-07-24 PROCEDURE — 2500000003 HC RX 250 WO HCPCS: Performed by: REGISTERED NURSE

## 2023-07-24 PROCEDURE — 6370000000 HC RX 637 (ALT 250 FOR IP): Performed by: STUDENT IN AN ORGANIZED HEALTH CARE EDUCATION/TRAINING PROGRAM

## 2023-07-24 RX ORDER — SODIUM CHLORIDE 0.9 % (FLUSH) 0.9 %
5-40 SYRINGE (ML) INJECTION EVERY 12 HOURS SCHEDULED
Status: DISCONTINUED | OUTPATIENT
Start: 2023-07-24 | End: 2023-07-24 | Stop reason: HOSPADM

## 2023-07-24 RX ORDER — SODIUM CHLORIDE 9 MG/ML
INJECTION, SOLUTION INTRAVENOUS PRN
Status: DISCONTINUED | OUTPATIENT
Start: 2023-07-24 | End: 2023-07-24 | Stop reason: HOSPADM

## 2023-07-24 RX ORDER — PROPOFOL 10 MG/ML
INJECTION, EMULSION INTRAVENOUS PRN
Status: DISCONTINUED | OUTPATIENT
Start: 2023-07-24 | End: 2023-07-24 | Stop reason: SDUPTHER

## 2023-07-24 RX ORDER — PROPOFOL 10 MG/ML
INJECTION, EMULSION INTRAVENOUS CONTINUOUS PRN
Status: DISCONTINUED | OUTPATIENT
Start: 2023-07-24 | End: 2023-07-24 | Stop reason: SDUPTHER

## 2023-07-24 RX ORDER — PROCHLORPERAZINE EDISYLATE 5 MG/ML
5 INJECTION INTRAMUSCULAR; INTRAVENOUS
Status: DISCONTINUED | OUTPATIENT
Start: 2023-07-24 | End: 2023-07-24 | Stop reason: HOSPADM

## 2023-07-24 RX ORDER — ONDANSETRON 2 MG/ML
4 INJECTION INTRAMUSCULAR; INTRAVENOUS
Status: COMPLETED | OUTPATIENT
Start: 2023-07-24 | End: 2023-07-24

## 2023-07-24 RX ORDER — SODIUM CHLORIDE, SODIUM LACTATE, POTASSIUM CHLORIDE, CALCIUM CHLORIDE 600; 310; 30; 20 MG/100ML; MG/100ML; MG/100ML; MG/100ML
INJECTION, SOLUTION INTRAVENOUS CONTINUOUS
Status: DISCONTINUED | OUTPATIENT
Start: 2023-07-24 | End: 2023-07-24 | Stop reason: HOSPADM

## 2023-07-24 RX ORDER — SODIUM CHLORIDE 0.9 % (FLUSH) 0.9 %
5-40 SYRINGE (ML) INJECTION PRN
Status: DISCONTINUED | OUTPATIENT
Start: 2023-07-24 | End: 2023-07-24 | Stop reason: HOSPADM

## 2023-07-24 RX ORDER — LIDOCAINE HYDROCHLORIDE 20 MG/ML
INJECTION, SOLUTION EPIDURAL; INFILTRATION; INTRACAUDAL; PERINEURAL PRN
Status: DISCONTINUED | OUTPATIENT
Start: 2023-07-24 | End: 2023-07-24 | Stop reason: SDUPTHER

## 2023-07-24 RX ORDER — HYDROMORPHONE HYDROCHLORIDE 2 MG/ML
0.5 INJECTION, SOLUTION INTRAMUSCULAR; INTRAVENOUS; SUBCUTANEOUS EVERY 5 MIN PRN
Status: DISCONTINUED | OUTPATIENT
Start: 2023-07-24 | End: 2023-07-24 | Stop reason: HOSPADM

## 2023-07-24 RX ORDER — MIDAZOLAM HYDROCHLORIDE 2 MG/2ML
2 INJECTION, SOLUTION INTRAMUSCULAR; INTRAVENOUS
Status: DISCONTINUED | OUTPATIENT
Start: 2023-07-24 | End: 2023-07-24 | Stop reason: HOSPADM

## 2023-07-24 RX ORDER — OXYCODONE HYDROCHLORIDE 5 MG/1
5 TABLET ORAL
Status: COMPLETED | OUTPATIENT
Start: 2023-07-24 | End: 2023-07-24

## 2023-07-24 RX ORDER — FENTANYL CITRATE 50 UG/ML
100 INJECTION, SOLUTION INTRAMUSCULAR; INTRAVENOUS
Status: DISCONTINUED | OUTPATIENT
Start: 2023-07-24 | End: 2023-07-24 | Stop reason: HOSPADM

## 2023-07-24 RX ORDER — LIDOCAINE HYDROCHLORIDE 10 MG/ML
1 INJECTION, SOLUTION INFILTRATION; PERINEURAL
Status: DISCONTINUED | OUTPATIENT
Start: 2023-07-24 | End: 2023-07-24 | Stop reason: HOSPADM

## 2023-07-24 RX ADMIN — SODIUM CHLORIDE, POTASSIUM CHLORIDE, SODIUM LACTATE AND CALCIUM CHLORIDE: 600; 310; 30; 20 INJECTION, SOLUTION INTRAVENOUS at 11:37

## 2023-07-24 RX ADMIN — LIDOCAINE HYDROCHLORIDE 50 MG: 20 INJECTION, SOLUTION EPIDURAL; INFILTRATION; INTRACAUDAL; PERINEURAL at 12:39

## 2023-07-24 RX ADMIN — PROPOFOL 50 MG: 10 INJECTION, EMULSION INTRAVENOUS at 12:30

## 2023-07-24 RX ADMIN — LIDOCAINE HYDROCHLORIDE 50 MG: 20 INJECTION, SOLUTION EPIDURAL; INFILTRATION; INTRACAUDAL; PERINEURAL at 12:30

## 2023-07-24 RX ADMIN — OXYCODONE HYDROCHLORIDE 5 MG: 5 TABLET ORAL at 13:09

## 2023-07-24 RX ADMIN — ONDANSETRON 4 MG: 2 INJECTION INTRAMUSCULAR; INTRAVENOUS at 13:27

## 2023-07-24 RX ADMIN — PROPOFOL 200 MCG/KG/MIN: 10 INJECTION, EMULSION INTRAVENOUS at 12:30

## 2023-07-24 ASSESSMENT — PAIN DESCRIPTION - DESCRIPTORS: DESCRIPTORS: ACHING

## 2023-07-24 ASSESSMENT — PAIN DESCRIPTION - ORIENTATION: ORIENTATION: LOWER

## 2023-07-24 ASSESSMENT — PAIN DESCRIPTION - LOCATION: LOCATION: BACK

## 2023-07-24 ASSESSMENT — PAIN SCALES - GENERAL: PAINLEVEL_OUTOF10: 10

## 2023-07-24 ASSESSMENT — PAIN - FUNCTIONAL ASSESSMENT: PAIN_FUNCTIONAL_ASSESSMENT: 0-10

## 2023-07-24 NOTE — ANESTHESIA POSTPROCEDURE EVALUATION
Department of Anesthesiology  Postprocedure Note    Patient: Gregorio Diaz  MRN: [id number]  YOB: 2000  Date of evaluation: 7/24/2023      Procedure Summary     Date: 07/24/23 Room / Location: D OP OR 08 / [facility]    Anesthesia Start: 1224 Anesthesia Stop: 1256    Procedure: BONE MARROW ASPIRATION BIOPSY (Hip) Diagnosis:       Anemia      Hemolytic anemia (HCC)      (Anemia [D64.9])      (Hemolytic anemia ([address]) [D58.9])    Surgeons: Nicolasa Aguila MD Responsible Provider: Devi De León MD    Anesthesia Type: TIVA ASA Status: 3          Anesthesia Type: TIVA    Salena Phase I: Salena Score: 8    Salena Phase II: Salena Score: 10      Anesthesia Post Evaluation    Patient location during evaluation: PACU  Patient participation: complete - patient participated  Level of consciousness: awake  Airway patency: patent  Nausea & Vomiting: no nausea and no vomiting  Complications: no  Cardiovascular status: blood pressure returned to baseline  Respiratory status: acceptable  Hydration status: euvolemic

## 2023-07-24 NOTE — PROCEDURES
Bone Marrow Biopsy and Aspiration Procedure Note     Informed consent was obtained and potential risks including bleeding, infection and pain were reviewed with the patient. Posterior iliac crest(s) prepped with Betadine. Lidocaine 2% local anesthesia infiltrated into the subcutaneous tissue. Right bone marrow biopsy and right bone marrow aspirate was obtained. The procedure was tolerated well and there were no complications.     Specimens sent for: routine histopathologic stains and sectioning, flow cytometry, cytogenetics, and molecular analysis    Physician: MD Nguyễn Sarmiento MD  Director, [address] [place], [place] [address]er and Blood Disorders Program  [address] [place] [address], [address] [place] [address] Phone

## 2023-07-28 LAB
FLOW CYTOMETRY RESULTS: NORMAL
SPECIMEN SOURCE: NORMAL
TEST ORDERED: NORMAL

## 2023-08-03 ENCOUNTER — HOSPITAL ENCOUNTER (OUTPATIENT)
Dept: INFUSION THERAPY | Age: 23
End: 2023-08-03

## 2023-08-04 ENCOUNTER — CLINICAL DOCUMENTATION (OUTPATIENT)
Dept: INFUSION THERAPY | Age: 23
End: 2023-08-04

## 2023-08-04 NOTE — PROGRESS NOTES
I spoke with Celsa Michaels. Patient was unaware her current insurance was listed as inactive. . Tru Pugh stated she will call her insurance company and notify me of her findings.

## 2023-08-04 NOTE — PROGRESS NOTES
I left a voicemail message for Aliza Jackson to return my call. Verified with [employer] and patient is not longer enrolled into the insurance program. Inactive was effective 6/1/2023.

## 2023-08-07 ENCOUNTER — CLINICAL DOCUMENTATION (OUTPATIENT)
Dept: INFUSION THERAPY | Age: 23
End: 2023-08-07

## 2023-08-07 NOTE — PROGRESS NOTES
I spoke with Sonal Key and she confirmed no insurance coverage at this time. Patient scheduled for infusion services on 8/8/2023. Referral sent to [facility].

## 2023-08-08 ENCOUNTER — CLINICAL DOCUMENTATION (OUTPATIENT)
Dept: ONCOLOGY | Age: 23
End: 2023-08-08

## 2023-08-08 ENCOUNTER — CLINICAL DOCUMENTATION (OUTPATIENT)
Dept: INFUSION THERAPY | Age: 23
End: 2023-08-08

## 2023-08-08 ENCOUNTER — HOSPITAL ENCOUNTER (OUTPATIENT)
Dept: INFUSION THERAPY | Age: 23
Discharge: HOME | End: 2023-08-08
Payer: MEDICAID

## 2023-08-08 ENCOUNTER — HOSPITAL ENCOUNTER (OUTPATIENT)
Dept: LAB | Age: 23
Discharge: HOME | End: 2023-08-11
Payer: MEDICAID

## 2023-08-08 ENCOUNTER — OFFICE VISIT (OUTPATIENT)
Dept: ONCOLOGY | Age: 23
End: 2023-08-08
Payer: MEDICAID

## 2023-08-08 VITALS
TEMPERATURE: 98 F | RESPIRATION RATE: 18 BRPM | HEART RATE: 89 BPM | SYSTOLIC BLOOD PRESSURE: 114 MMHG | DIASTOLIC BLOOD PRESSURE: 71 MMHG

## 2023-08-08 VITALS
HEART RATE: 85 BPM | BODY MASS INDEX: 27.31 KG/M2 | OXYGEN SATURATION: 100 % | DIASTOLIC BLOOD PRESSURE: 67 MMHG | TEMPERATURE: 98.2 F | WEIGHT: 163.9 LBS | HEIGHT: 65 IN | RESPIRATION RATE: 16 BRPM | SYSTOLIC BLOOD PRESSURE: 106 MMHG

## 2023-08-08 DIAGNOSIS — D59.30 HEMOLYTIC-UREMIC SYNDROME, UNSPECIFIED SUBTYPE (HCC): Primary | ICD-10-CM

## 2023-08-08 DIAGNOSIS — D59.10 AIHA (AUTOIMMUNE HEMOLYTIC ANEMIA) (HCC): ICD-10-CM

## 2023-08-08 DIAGNOSIS — D64.9 ANEMIA, UNSPECIFIED TYPE: ICD-10-CM

## 2023-08-08 LAB
ALBUMIN SERPL-MCNC: 3.9 G/DL (ref 3.5–5)
ALBUMIN/GLOB SERPL: 1.1 (ref 0.4–1.6)
ALP SERPL-CCNC: 149 U/L (ref 50–136)
ALT SERPL-CCNC: 25 U/L (ref 12–65)
ANION GAP SERPL CALC-SCNC: 5 MMOL/L (ref 2–11)
AST SERPL-CCNC: 11 U/L (ref 15–37)
BASOPHILS # BLD: 0.1 K/UL (ref 0–0.2)
BASOPHILS NFR BLD: 1 % (ref 0–2)
BILIRUB SERPL-MCNC: 0.5 MG/DL (ref 0.2–1.1)
BUN SERPL-MCNC: 8 MG/DL (ref 6–23)
CALCIUM SERPL-MCNC: 8.6 MG/DL (ref 8.3–10.4)
CHLORIDE SERPL-SCNC: 110 MMOL/L (ref 101–110)
CO2 SERPL-SCNC: 26 MMOL/L (ref 21–32)
CREAT SERPL-MCNC: 0.6 MG/DL (ref 0.6–1)
DIFFERENTIAL METHOD BLD: ABNORMAL
EOSINOPHIL # BLD: 0.1 K/UL (ref 0–0.8)
EOSINOPHIL NFR BLD: 1 % (ref 0.5–7.8)
ERYTHROCYTE [DISTWIDTH] IN BLOOD BY AUTOMATED COUNT: 17.9 % (ref 11.9–14.6)
GLOBULIN SER CALC-MCNC: 3.5 G/DL (ref 2.8–4.5)
GLUCOSE SERPL-MCNC: 89 MG/DL (ref 65–100)
HCT VFR BLD AUTO: 36.3 % (ref 35.8–46.3)
HGB BLD-MCNC: 11.4 G/DL (ref 11.7–15.4)
HGB RETIC QN AUTO: 39 PG (ref 29–35)
IMM GRANULOCYTES # BLD AUTO: 0 K/UL (ref 0–0.5)
IMM GRANULOCYTES NFR BLD AUTO: 0 % (ref 0–5)
IMM RETICS NFR: 8.6 % (ref 3–15.9)
LYMPHOCYTES # BLD: 2 K/UL (ref 0.5–4.6)
LYMPHOCYTES NFR BLD: 43 % (ref 13–44)
MCH RBC QN AUTO: 29.8 PG (ref 26.1–32.9)
MCHC RBC AUTO-ENTMCNC: 31.4 G/DL (ref 31.4–35)
MCV RBC AUTO: 95 FL (ref 82–102)
MONOCYTES # BLD: 0.3 K/UL (ref 0.1–1.3)
MONOCYTES NFR BLD: 7 % (ref 4–12)
NEUTS SEG # BLD: 2.2 K/UL (ref 1.7–8.2)
NEUTS SEG NFR BLD: 48 % (ref 43–78)
NRBC # BLD: 0 K/UL (ref 0–0.2)
PLATELET # BLD AUTO: 291 K/UL (ref 150–450)
PMV BLD AUTO: 9.8 FL (ref 9.4–12.3)
POTASSIUM SERPL-SCNC: 3.9 MMOL/L (ref 3.5–5.1)
PROT SERPL-MCNC: 7.4 G/DL (ref 6.3–8.2)
RBC # BLD AUTO: 3.82 M/UL (ref 4.05–5.2)
RETICS # AUTO: 0.05 M/UL (ref 0.03–0.1)
RETICS/RBC NFR AUTO: 1.3 % (ref 0.3–2)
SODIUM SERPL-SCNC: 141 MMOL/L (ref 133–143)
WBC # BLD AUTO: 4.6 K/UL (ref 4.3–11.1)

## 2023-08-08 PROCEDURE — 6360000002 HC RX W HCPCS: Performed by: PEDIATRICS

## 2023-08-08 PROCEDURE — 85046 RETICYTE/HGB CONCENTRATE: CPT

## 2023-08-08 PROCEDURE — 99215 OFFICE O/P EST HI 40 MIN: CPT | Performed by: PEDIATRICS

## 2023-08-08 PROCEDURE — 96376 TX/PRO/DX INJ SAME DRUG ADON: CPT

## 2023-08-08 PROCEDURE — 85025 COMPLETE CBC W/AUTO DIFF WBC: CPT

## 2023-08-08 PROCEDURE — 6370000000 HC RX 637 (ALT 250 FOR IP): Performed by: PEDIATRICS

## 2023-08-08 PROCEDURE — 2580000003 HC RX 258: Performed by: PEDIATRICS

## 2023-08-08 PROCEDURE — 80053 COMPREHEN METABOLIC PANEL: CPT

## 2023-08-08 PROCEDURE — 96415 CHEMO IV INFUSION ADDL HR: CPT

## 2023-08-08 PROCEDURE — 36415 COLL VENOUS BLD VENIPUNCTURE: CPT

## 2023-08-08 PROCEDURE — 96413 CHEMO IV INFUSION 1 HR: CPT

## 2023-08-08 PROCEDURE — 96375 TX/PRO/DX INJ NEW DRUG ADDON: CPT

## 2023-08-08 RX ORDER — SODIUM CHLORIDE 9 MG/ML
5-250 INJECTION, SOLUTION INTRAVENOUS PRN
Status: CANCELLED | OUTPATIENT
Start: 2023-08-08

## 2023-08-08 RX ORDER — HEPARIN SODIUM (PORCINE) LOCK FLUSH IV SOLN 100 UNIT/ML 100 UNIT/ML
500 SOLUTION INTRAVENOUS PRN
OUTPATIENT
Start: 2023-08-17

## 2023-08-08 RX ORDER — ALBUTEROL SULFATE 90 UG/1
4 AEROSOL, METERED RESPIRATORY (INHALATION) PRN
OUTPATIENT
Start: 2023-08-17

## 2023-08-08 RX ORDER — DIPHENHYDRAMINE HYDROCHLORIDE 50 MG/ML
25 INJECTION INTRAMUSCULAR; INTRAVENOUS ONCE
OUTPATIENT
Start: 2023-08-10 | End: 2023-08-10

## 2023-08-08 RX ORDER — DIPHENHYDRAMINE HYDROCHLORIDE 50 MG/ML
50 INJECTION INTRAMUSCULAR; INTRAVENOUS
Status: CANCELLED | OUTPATIENT
Start: 2023-08-08

## 2023-08-08 RX ORDER — EPINEPHRINE 1 MG/ML
0.3 INJECTION, SOLUTION, CONCENTRATE INTRAVENOUS PRN
OUTPATIENT
Start: 2023-08-17

## 2023-08-08 RX ORDER — ACETAMINOPHEN 325 MG/1
650 TABLET ORAL ONCE
Status: COMPLETED | OUTPATIENT
Start: 2023-08-08 | End: 2023-08-08

## 2023-08-08 RX ORDER — SODIUM CHLORIDE 0.9 % (FLUSH) 0.9 %
5-40 SYRINGE (ML) INJECTION PRN
OUTPATIENT
Start: 2023-08-10

## 2023-08-08 RX ORDER — ALBUTEROL SULFATE 90 UG/1
4 AEROSOL, METERED RESPIRATORY (INHALATION) PRN
OUTPATIENT
Start: 2023-08-10

## 2023-08-08 RX ORDER — DIPHENHYDRAMINE HYDROCHLORIDE 50 MG/ML
25 INJECTION INTRAMUSCULAR; INTRAVENOUS ONCE
Status: COMPLETED | OUTPATIENT
Start: 2023-08-08 | End: 2023-08-08

## 2023-08-08 RX ORDER — HEPARIN SODIUM (PORCINE) LOCK FLUSH IV SOLN 100 UNIT/ML 100 UNIT/ML
500 SOLUTION INTRAVENOUS PRN
OUTPATIENT
Start: 2023-08-10

## 2023-08-08 RX ORDER — SODIUM CHLORIDE 9 MG/ML
INJECTION, SOLUTION INTRAVENOUS CONTINUOUS
OUTPATIENT
Start: 2023-08-10

## 2023-08-08 RX ORDER — ONDANSETRON 2 MG/ML
8 INJECTION INTRAMUSCULAR; INTRAVENOUS
Status: DISCONTINUED | OUTPATIENT
Start: 2023-08-08 | End: 2023-08-09 | Stop reason: HOSPADM

## 2023-08-08 RX ORDER — ACETAMINOPHEN 325 MG/1
650 TABLET ORAL
Status: DISCONTINUED | OUTPATIENT
Start: 2023-08-08 | End: 2023-08-09 | Stop reason: HOSPADM

## 2023-08-08 RX ORDER — ONDANSETRON 2 MG/ML
8 INJECTION INTRAMUSCULAR; INTRAVENOUS
OUTPATIENT
Start: 2023-08-17

## 2023-08-08 RX ORDER — FAMOTIDINE 10 MG/ML
20 INJECTION, SOLUTION INTRAVENOUS
OUTPATIENT
Start: 2023-08-24

## 2023-08-08 RX ORDER — EPINEPHRINE 1 MG/ML
0.3 INJECTION, SOLUTION, CONCENTRATE INTRAVENOUS PRN
Status: CANCELLED | OUTPATIENT
Start: 2023-08-08

## 2023-08-08 RX ORDER — FAMOTIDINE 10 MG/ML
20 INJECTION, SOLUTION INTRAVENOUS
Status: CANCELLED | OUTPATIENT
Start: 2023-08-08

## 2023-08-08 RX ORDER — ONDANSETRON 2 MG/ML
8 INJECTION INTRAMUSCULAR; INTRAVENOUS
OUTPATIENT
Start: 2023-08-10

## 2023-08-08 RX ORDER — ACETAMINOPHEN 325 MG/1
650 TABLET ORAL ONCE
OUTPATIENT
Start: 2023-08-24 | End: 2023-08-24

## 2023-08-08 RX ORDER — ACETAMINOPHEN 325 MG/1
650 TABLET ORAL
OUTPATIENT
Start: 2023-08-24

## 2023-08-08 RX ORDER — SODIUM CHLORIDE 9 MG/ML
5-250 INJECTION, SOLUTION INTRAVENOUS PRN
Status: DISCONTINUED | OUTPATIENT
Start: 2023-08-08 | End: 2023-08-09 | Stop reason: HOSPADM

## 2023-08-08 RX ORDER — ACETAMINOPHEN 325 MG/1
650 TABLET ORAL
OUTPATIENT
Start: 2023-08-10

## 2023-08-08 RX ORDER — DIPHENHYDRAMINE HYDROCHLORIDE 50 MG/ML
50 INJECTION INTRAMUSCULAR; INTRAVENOUS
OUTPATIENT
Start: 2023-08-24

## 2023-08-08 RX ORDER — EPINEPHRINE 1 MG/ML
0.3 INJECTION, SOLUTION, CONCENTRATE INTRAVENOUS PRN
OUTPATIENT
Start: 2023-08-24

## 2023-08-08 RX ORDER — MEPERIDINE HYDROCHLORIDE 50 MG/ML
12.5 INJECTION INTRAMUSCULAR; INTRAVENOUS; SUBCUTANEOUS PRN
Status: CANCELLED | OUTPATIENT
Start: 2023-08-08

## 2023-08-08 RX ORDER — EPINEPHRINE 1 MG/ML
0.3 INJECTION, SOLUTION, CONCENTRATE INTRAVENOUS PRN
OUTPATIENT
Start: 2023-08-10

## 2023-08-08 RX ORDER — EPINEPHRINE 1 MG/ML
0.3 INJECTION, SOLUTION, CONCENTRATE INTRAVENOUS PRN
Status: DISCONTINUED | OUTPATIENT
Start: 2023-08-08 | End: 2023-08-09 | Stop reason: HOSPADM

## 2023-08-08 RX ORDER — SODIUM CHLORIDE 0.9 % (FLUSH) 0.9 %
5-40 SYRINGE (ML) INJECTION PRN
OUTPATIENT
Start: 2023-08-24

## 2023-08-08 RX ORDER — HEPARIN SODIUM (PORCINE) LOCK FLUSH IV SOLN 100 UNIT/ML 100 UNIT/ML
500 SOLUTION INTRAVENOUS PRN
Status: CANCELLED | OUTPATIENT
Start: 2023-08-08

## 2023-08-08 RX ORDER — ONDANSETRON 2 MG/ML
8 INJECTION INTRAMUSCULAR; INTRAVENOUS
OUTPATIENT
Start: 2023-08-24

## 2023-08-08 RX ORDER — SODIUM CHLORIDE 9 MG/ML
INJECTION, SOLUTION INTRAVENOUS CONTINUOUS
Status: DISCONTINUED | OUTPATIENT
Start: 2023-08-08 | End: 2023-08-09 | Stop reason: HOSPADM

## 2023-08-08 RX ORDER — ACETAMINOPHEN 325 MG/1
650 TABLET ORAL ONCE
OUTPATIENT
Start: 2023-08-17 | End: 2023-08-17

## 2023-08-08 RX ORDER — SODIUM CHLORIDE 9 MG/ML
5-250 INJECTION, SOLUTION INTRAVENOUS PRN
OUTPATIENT
Start: 2023-08-17

## 2023-08-08 RX ORDER — ACETAMINOPHEN 325 MG/1
650 TABLET ORAL
Status: CANCELLED | OUTPATIENT
Start: 2023-08-08

## 2023-08-08 RX ORDER — HEPARIN SODIUM (PORCINE) LOCK FLUSH IV SOLN 100 UNIT/ML 100 UNIT/ML
500 SOLUTION INTRAVENOUS PRN
OUTPATIENT
Start: 2023-08-24

## 2023-08-08 RX ORDER — SODIUM CHLORIDE 0.9 % (FLUSH) 0.9 %
5-40 SYRINGE (ML) INJECTION PRN
OUTPATIENT
Start: 2023-08-17

## 2023-08-08 RX ORDER — SODIUM CHLORIDE 0.9 % (FLUSH) 0.9 %
5-40 SYRINGE (ML) INJECTION PRN
Status: CANCELLED | OUTPATIENT
Start: 2023-08-08

## 2023-08-08 RX ORDER — DIPHENHYDRAMINE HYDROCHLORIDE 50 MG/ML
50 INJECTION INTRAMUSCULAR; INTRAVENOUS
Status: COMPLETED | OUTPATIENT
Start: 2023-08-08 | End: 2023-08-08

## 2023-08-08 RX ORDER — DIPHENHYDRAMINE HYDROCHLORIDE 50 MG/ML
50 INJECTION INTRAMUSCULAR; INTRAVENOUS
OUTPATIENT
Start: 2023-08-10

## 2023-08-08 RX ORDER — MEPERIDINE HYDROCHLORIDE 50 MG/ML
12.5 INJECTION INTRAMUSCULAR; INTRAVENOUS; SUBCUTANEOUS PRN
OUTPATIENT
Start: 2023-08-10

## 2023-08-08 RX ORDER — ONDANSETRON 2 MG/ML
8 INJECTION INTRAMUSCULAR; INTRAVENOUS
Status: CANCELLED | OUTPATIENT
Start: 2023-08-08

## 2023-08-08 RX ORDER — MEPERIDINE HYDROCHLORIDE 25 MG/ML
12.5 INJECTION INTRAMUSCULAR; INTRAVENOUS; SUBCUTANEOUS PRN
Status: DISCONTINUED | OUTPATIENT
Start: 2023-08-08 | End: 2023-08-09 | Stop reason: HOSPADM

## 2023-08-08 RX ORDER — DIPHENHYDRAMINE HYDROCHLORIDE 50 MG/ML
50 INJECTION INTRAMUSCULAR; INTRAVENOUS
OUTPATIENT
Start: 2023-08-17

## 2023-08-08 RX ORDER — ACETAMINOPHEN 325 MG/1
650 TABLET ORAL ONCE
OUTPATIENT
Start: 2023-08-10 | End: 2023-08-10

## 2023-08-08 RX ORDER — MEPERIDINE HYDROCHLORIDE 50 MG/ML
12.5 INJECTION INTRAMUSCULAR; INTRAVENOUS; SUBCUTANEOUS PRN
OUTPATIENT
Start: 2023-08-24

## 2023-08-08 RX ORDER — ACETAMINOPHEN 325 MG/1
650 TABLET ORAL
OUTPATIENT
Start: 2023-08-17

## 2023-08-08 RX ORDER — SODIUM CHLORIDE 9 MG/ML
5-250 INJECTION, SOLUTION INTRAVENOUS PRN
OUTPATIENT
Start: 2023-08-24

## 2023-08-08 RX ORDER — ALBUTEROL SULFATE 90 UG/1
4 AEROSOL, METERED RESPIRATORY (INHALATION) PRN
Status: CANCELLED | OUTPATIENT
Start: 2023-08-08

## 2023-08-08 RX ORDER — FAMOTIDINE 10 MG/ML
20 INJECTION, SOLUTION INTRAVENOUS
OUTPATIENT
Start: 2023-08-10

## 2023-08-08 RX ORDER — SODIUM CHLORIDE 9 MG/ML
INJECTION, SOLUTION INTRAVENOUS CONTINUOUS
OUTPATIENT
Start: 2023-08-24

## 2023-08-08 RX ORDER — ACETAMINOPHEN 325 MG/1
650 TABLET ORAL ONCE
Status: CANCELLED | OUTPATIENT
Start: 2023-08-08 | End: 2023-08-08

## 2023-08-08 RX ORDER — ALBUTEROL SULFATE 90 UG/1
4 AEROSOL, METERED RESPIRATORY (INHALATION) PRN
OUTPATIENT
Start: 2023-08-24

## 2023-08-08 RX ORDER — DIPHENHYDRAMINE HYDROCHLORIDE 50 MG/ML
25 INJECTION INTRAMUSCULAR; INTRAVENOUS ONCE
Status: CANCELLED | OUTPATIENT
Start: 2023-08-08 | End: 2023-08-08

## 2023-08-08 RX ORDER — SODIUM CHLORIDE 0.9 % (FLUSH) 0.9 %
5-40 SYRINGE (ML) INJECTION PRN
Status: DISCONTINUED | OUTPATIENT
Start: 2023-08-08 | End: 2023-08-09 | Stop reason: HOSPADM

## 2023-08-08 RX ORDER — DIPHENHYDRAMINE HYDROCHLORIDE 50 MG/ML
25 INJECTION INTRAMUSCULAR; INTRAVENOUS ONCE
OUTPATIENT
Start: 2023-08-17 | End: 2023-08-17

## 2023-08-08 RX ORDER — SODIUM CHLORIDE 9 MG/ML
5-250 INJECTION, SOLUTION INTRAVENOUS PRN
OUTPATIENT
Start: 2023-08-10

## 2023-08-08 RX ORDER — ALBUTEROL SULFATE 90 UG/1
4 AEROSOL, METERED RESPIRATORY (INHALATION) PRN
Status: DISCONTINUED | OUTPATIENT
Start: 2023-08-08 | End: 2023-08-09 | Stop reason: HOSPADM

## 2023-08-08 RX ORDER — SODIUM CHLORIDE 9 MG/ML
INJECTION, SOLUTION INTRAVENOUS CONTINUOUS
Status: CANCELLED | OUTPATIENT
Start: 2023-08-08

## 2023-08-08 RX ORDER — FAMOTIDINE 10 MG/ML
20 INJECTION, SOLUTION INTRAVENOUS
OUTPATIENT
Start: 2023-08-17

## 2023-08-08 RX ORDER — SODIUM CHLORIDE 9 MG/ML
INJECTION, SOLUTION INTRAVENOUS CONTINUOUS
OUTPATIENT
Start: 2023-08-17

## 2023-08-08 RX ORDER — MEPERIDINE HYDROCHLORIDE 50 MG/ML
12.5 INJECTION INTRAMUSCULAR; INTRAVENOUS; SUBCUTANEOUS PRN
OUTPATIENT
Start: 2023-08-17

## 2023-08-08 RX ORDER — DIPHENHYDRAMINE HYDROCHLORIDE 50 MG/ML
25 INJECTION INTRAMUSCULAR; INTRAVENOUS ONCE
OUTPATIENT
Start: 2023-08-24 | End: 2023-08-24

## 2023-08-08 RX ADMIN — ACETAMINOPHEN 650 MG: 325 TABLET ORAL at 10:25

## 2023-08-08 RX ADMIN — SODIUM CHLORIDE, PRESERVATIVE FREE 10 ML: 5 INJECTION INTRAVENOUS at 10:10

## 2023-08-08 RX ADMIN — DIPHENHYDRAMINE HYDROCHLORIDE 50 MG: 50 INJECTION INTRAMUSCULAR; INTRAVENOUS at 13:00

## 2023-08-08 RX ADMIN — DIPHENHYDRAMINE HYDROCHLORIDE 25 MG: 50 INJECTION INTRAMUSCULAR; INTRAVENOUS at 10:25

## 2023-08-08 RX ADMIN — HYDROCORTISONE SODIUM SUCCINATE 100 MG: 100 INJECTION, POWDER, FOR SOLUTION INTRAMUSCULAR; INTRAVENOUS at 13:02

## 2023-08-08 RX ADMIN — RITUXIMAB 680 MG: 10 INJECTION, SOLUTION INTRAVENOUS at 11:06

## 2023-08-08 ASSESSMENT — PATIENT HEALTH QUESTIONNAIRE - PHQ9
2. FEELING DOWN, DEPRESSED OR HOPELESS: 0
3. TROUBLE FALLING OR STAYING ASLEEP: 0
8. MOVING OR SPEAKING SO SLOWLY THAT OTHER PEOPLE COULD HAVE NOTICED. OR THE OPPOSITE, BEING SO FIGETY OR RESTLESS THAT YOU HAVE BEEN MOVING AROUND A LOT MORE THAN USUAL: 0
SUM OF ALL RESPONSES TO PHQ QUESTIONS 1-9: 3
SUM OF ALL RESPONSES TO PHQ9 QUESTIONS 1 & 2: 0
7. TROUBLE CONCENTRATING ON THINGS, SUCH AS READING THE NEWSPAPER OR WATCHING TELEVISION: 0
4. FEELING TIRED OR HAVING LITTLE ENERGY: 3
5. POOR APPETITE OR OVEREATING: 0
9. THOUGHTS THAT YOU WOULD BE BETTER OFF DEAD, OR OF HURTING YOURSELF: 0
SUM OF ALL RESPONSES TO PHQ QUESTIONS 1-9: 3
1. LITTLE INTEREST OR PLEASURE IN DOING THINGS: 0
6. FEELING BAD ABOUT YOURSELF - OR THAT YOU ARE A FAILURE OR HAVE LET YOURSELF OR YOUR FAMILY DOWN: 0
SUM OF ALL RESPONSES TO PHQ QUESTIONS 1-9: 3
SUM OF ALL RESPONSES TO PHQ QUESTIONS 1-9: 3
10. IF YOU CHECKED OFF ANY PROBLEMS, HOW DIFFICULT HAVE THESE PROBLEMS MADE IT FOR YOU TO DO YOUR WORK, TAKE CARE OF THINGS AT HOME, OR GET ALONG WITH OTHER PEOPLE: 0

## 2023-08-08 ASSESSMENT — ANXIETY QUESTIONNAIRES
4. TROUBLE RELAXING: 0
IF YOU CHECKED OFF ANY PROBLEMS ON THIS QUESTIONNAIRE, HOW DIFFICULT HAVE THESE PROBLEMS MADE IT FOR YOU TO DO YOUR WORK, TAKE CARE OF THINGS AT HOME, OR GET ALONG WITH OTHER PEOPLE: NOT DIFFICULT AT ALL
5. BEING SO RESTLESS THAT IT IS HARD TO SIT STILL: 0
7. FEELING AFRAID AS IF SOMETHING AWFUL MIGHT HAPPEN: 0
GAD7 TOTAL SCORE: 0
6. BECOMING EASILY ANNOYED OR IRRITABLE: 0
1. FEELING NERVOUS, ANXIOUS, OR ON EDGE: 0
2. NOT BEING ABLE TO STOP OR CONTROL WORRYING: 0
3. WORRYING TOO MUCH ABOUT DIFFERENT THINGS: 0

## 2023-08-08 NOTE — PROGRESS NOTES
HISTORY OF PRESENT ILLNESS  Joann Sagastume is a 24 y.o. y.o. female with hemolytic anemia    ABSTRACT  Reason for Referral: Severe anemia     Referring Provider: Nelda Pacheco MD     Primary Care Provider: CHHAYA Roberson NP     Family History of Cancer/Hematologic Disorders: None documented. Presenting Symptoms: Chronic shortness of breath, fatigue, chest pain, weakness, dizziness, headaches, nosebleeds, inguinal lymphadenopathy, nausea, vomiting, weight loss, and decreased appetite     Narrative with recent with Results/Procedures/Biopsies and Dates completed: Ms. Verona Meza is a 39-year-old black female with a history of pineal gland cyst, migraines, auto immune disease, cholecystectomy, and orthopedic surgery. She was evaluated in consultation by Dr. Viviane Grossman at [facility] on 8/9/22 for severe macrocytic anemia with HGB of 7.3. She reported symptoms of chronic shortness of breath, fatigue, chest pain, weakness, dizziness, headaches, nosebleeds, nausea, vomiting, weight loss, and decreased appetite. She denied any major bleeding and reported irregular menstrual periods. Extensive work-up for anemia was initiated. Labs showed positive urobilinogen, high bilirubin, high LDH, and low haptoglobin. She was started on 60 mg Prednisone for suspected hemolysis. PNH was negative, cold agglutinin was negative, direct Kimberly negative, multiple myeloma negative, hepatitis panel negative, HIV negative. Platelets and white count were normal. JSESICA was positive. CT of the abdomen and pelvis with contrast on 8/22/22 showed no major evidence of abnormalities in the liver or spleen or lymphadenopathy. Dr. Pham France noted that patient was most likely suffering from an autoimmune hemolytic anemia. With prednisone, patient's LDH, retic count, and bilirubin normalized; hemoglobin improved; and macrocytosis resolved. Prednisone was tapered down and then stopped in November of 2022.       Patient was lost to

## 2023-08-08 NOTE — PROGRESS NOTES
Arrived to the [address] [facility]. Rituxan infusion started. Patient tolerated infusion until the rate was at 150. She complained of a sore throat, so I keep a close eye on her. I then increased to 200, and then she complained of pain on the tip of her tongue. Upon assessment it was red and looked inflammed. Infusion stopped and 25 mg Benadryl given and 100 mg Solucortef given. Call to [facility]. Orders received from Dr. Edwige Hurst to restart at 150 ml/hr and leave her there to finish and call if an issues occur. Patient aware of next infusion appointment on 08/15/2023 (date) at 0900 (time). Discharged ambulatory.  Report given to \[facility]\""

## 2023-08-08 NOTE — PATIENT INSTRUCTIONS
arthritis, Crohn's disease, ulcerative colitis, or psoriasis --adalimumab, certolizumab, etanercept, golimumab, infliximab, leflunomide, methotrexate, sulfasalazine, tocilizumab, tofacitinib, and others. This list is not complete. Other drugs may affect rituximab, including prescription and over-the-counter medicines, vitamins, and herbal products. Not all possible drug interactions are listed here. Where can I get more information? Your doctor or pharmacist can provide more information about rituximab. Remember, keep this and all other medicines out of the reach of children, never share your medicines with others, and use this medication only for the indication prescribed. Every effort has been made to ensure that the information provided by [address] [place] [address] is accurate, up-to-date, and complete, but no guarantee is made to that effect. Drug information contained herein may be time sensitive. [facility] information has been compiled for use by healthcare practitioners and consumers in the [facility] and therefore [facility] does not warrant that uses outside of the [facility] are appropriate, unless specifically indicated otherwise. [facility]'s drug information does not endorse drugs, diagnose patients or recommend therapy. [facility] drug information is an informational resource designed to assist licensed healthcare practitioners in caring for their patients and/or to serve consumers viewing this service as a supplement to, and not a substitute for, the expertise, skill, knowledge and judgment of healthcare practitioners. The absence of a warning for a given drug or drug combination in no way should be construed to indicate that the drug or drug combination is safe, effective or appropriate for any given patient. [facility] does not assume any responsibility for any aspect of healthcare administered with the aid of information [facility] provides.  The information contained herein is not intended to cover all

## 2023-08-08 NOTE — PROGRESS NOTES
Patient Assistance    Met with: Vic Vail     Navigator Type:  Infusion  Documentation Type: Assistance Review  Contact Type: Telephone  Navigation Status: Insurance Optimization Review  Status of Patient Insurance Coverage: Patient does not have active coverage          Additional notes: free drug/uninsured     Drug Name: Rituxan  Form of PAP Assistance: Free Drug

## 2023-08-08 NOTE — PROGRESS NOTES
Care of patient assumed, D1C1 Rituxan completed. Patient monitored for 30 mins post infusion, discharged ambulatory in stable condition.

## 2023-08-18 ENCOUNTER — TELEPHONE (OUTPATIENT)
Dept: ONCOLOGY | Age: 23
End: 2023-08-18

## 2023-08-18 NOTE — TELEPHONE ENCOUNTER
Received message from infusion center that pt no showed for Rituxan (dose 2) on 8-15-23. Called pt to inquire. She stated that Rituxan was approved in [place] and that she is receiving there now. Pt confirmed she received 2nd dose of Rituxan on 8-15-23. Pt states she is scheduled for dose 3 and 4 on 8-22 and 8-29. Reminded pt that we were going to check CBC again on 8-22 and to ask her infusion center to check/follow that. She confirms that she would like to follow up with our office visit in Oct as scheduled. [facility] team notified to cancel upcoming infusion/lab apts.

## 2023-08-22 ENCOUNTER — HOSPITAL ENCOUNTER (OUTPATIENT)
Dept: INFUSION THERAPY | Age: 23
End: 2023-08-22

## 2023-10-06 DIAGNOSIS — D59.30 HEMOLYTIC-UREMIC SYNDROME, UNSPECIFIED SUBTYPE (HCC): ICD-10-CM

## 2023-10-06 DIAGNOSIS — D64.9 ANEMIA, UNSPECIFIED TYPE: ICD-10-CM

## 2023-10-06 DIAGNOSIS — D59.10 AIHA (AUTOIMMUNE HEMOLYTIC ANEMIA) (HCC): Primary | ICD-10-CM

## 2023-11-09 DIAGNOSIS — R16.1 SPLENOMEGALY: ICD-10-CM

## 2023-11-09 DIAGNOSIS — B37.9 YEAST INFECTION: ICD-10-CM

## 2023-11-09 DIAGNOSIS — D59.10 AIHA (AUTOIMMUNE HEMOLYTIC ANEMIA) (HCC): ICD-10-CM

## 2023-11-09 DIAGNOSIS — R59.1 LAD (LYMPHADENOPATHY): ICD-10-CM

## 2023-11-09 DIAGNOSIS — Z87.892 HX OF ANAPHYLAXIS: ICD-10-CM

## 2023-11-28 DIAGNOSIS — D59.10 AIHA (AUTOIMMUNE HEMOLYTIC ANEMIA) (HCC): Primary | ICD-10-CM

## 2023-11-28 DIAGNOSIS — R53.83 FATIGUE, UNSPECIFIED TYPE: ICD-10-CM

## 2023-11-28 DIAGNOSIS — R59.1 LAD (LYMPHADENOPATHY): ICD-10-CM

## 2023-11-28 DIAGNOSIS — D64.9 ANEMIA, UNSPECIFIED TYPE: ICD-10-CM

## 2023-12-06 RX ORDER — FLUCONAZOLE 200 MG/1
200 TABLET ORAL DAILY
Qty: 30 TABLET | Refills: 0 | OUTPATIENT
Start: 2023-12-06

## 2023-12-07 ENCOUNTER — HOSPITAL ENCOUNTER (OUTPATIENT)
Dept: LAB | Age: 23
Discharge: HOME | End: 2023-12-07
Payer: COMMERCIAL

## 2023-12-07 ENCOUNTER — OFFICE VISIT (OUTPATIENT)
Dept: ONCOLOGY | Age: 23
End: 2023-12-07
Payer: COMMERCIAL

## 2023-12-07 VITALS
OXYGEN SATURATION: 100 % | BODY MASS INDEX: 30.27 KG/M2 | HEIGHT: 65 IN | WEIGHT: 181.7 LBS | TEMPERATURE: 98.2 F | HEART RATE: 84 BPM | DIASTOLIC BLOOD PRESSURE: 76 MMHG | RESPIRATION RATE: 16 BRPM | SYSTOLIC BLOOD PRESSURE: 110 MMHG

## 2023-12-07 DIAGNOSIS — D59.10 AIHA (AUTOIMMUNE HEMOLYTIC ANEMIA) (HCC): ICD-10-CM

## 2023-12-07 DIAGNOSIS — D64.9 ANEMIA, UNSPECIFIED TYPE: ICD-10-CM

## 2023-12-07 DIAGNOSIS — R79.89 ELEVATED FERRITIN: ICD-10-CM

## 2023-12-07 DIAGNOSIS — D59.10 AIHA (AUTOIMMUNE HEMOLYTIC ANEMIA) (HCC): Primary | ICD-10-CM

## 2023-12-07 DIAGNOSIS — R59.1 LAD (LYMPHADENOPATHY): ICD-10-CM

## 2023-12-07 DIAGNOSIS — R53.83 FATIGUE, UNSPECIFIED TYPE: ICD-10-CM

## 2023-12-07 LAB
ALBUMIN SERPL-MCNC: 3.8 G/DL (ref 3.5–5)
ALBUMIN/GLOB SERPL: 0.9 (ref 0.4–1.6)
ALP SERPL-CCNC: 106 U/L (ref 50–136)
ALT SERPL-CCNC: 54 U/L (ref 12–65)
ANION GAP SERPL CALC-SCNC: 1 MMOL/L (ref 2–11)
AST SERPL-CCNC: 58 U/L (ref 15–37)
BASOPHILS # BLD: 0 K/UL (ref 0–0.2)
BASOPHILS NFR BLD: 0 % (ref 0–2)
BILIRUB SERPL-MCNC: 1 MG/DL (ref 0.2–1.1)
BUN SERPL-MCNC: 9 MG/DL (ref 6–23)
CALCIUM SERPL-MCNC: 8.9 MG/DL (ref 8.3–10.4)
CHLORIDE SERPL-SCNC: 108 MMOL/L (ref 103–113)
CO2 SERPL-SCNC: 28 MMOL/L (ref 21–32)
CREAT SERPL-MCNC: 0.6 MG/DL (ref 0.6–1)
DAT POLY-SP REAG RBC QL: NORMAL
DIFFERENTIAL METHOD BLD: ABNORMAL
EOSINOPHIL # BLD: 0.2 K/UL (ref 0–0.8)
EOSINOPHIL NFR BLD: 3 % (ref 0.5–7.8)
ERYTHROCYTE [DISTWIDTH] IN BLOOD BY AUTOMATED COUNT: 22.2 % (ref 11.9–14.6)
GLOBULIN SER CALC-MCNC: 4.4 G/DL (ref 2.8–4.5)
GLUCOSE SERPL-MCNC: 92 MG/DL (ref 65–100)
HAPTOGLOB SERPL-MCNC: <8 MG/DL (ref 30–200)
HCT VFR BLD AUTO: 26.8 % (ref 35.8–46.3)
HGB BLD-MCNC: 8.9 G/DL (ref 11.7–15.4)
HGB RETIC QN AUTO: 45 PG (ref 29–35)
IMM GRANULOCYTES # BLD AUTO: 0.1 K/UL (ref 0–0.5)
IMM GRANULOCYTES NFR BLD AUTO: 2 % (ref 0–5)
IMM RETICS NFR: 14.1 % (ref 3–15.9)
LDH SERPL L TO P-CCNC: 1631 U/L (ref 100–190)
LYMPHOCYTES # BLD: 2.3 K/UL (ref 0.5–4.6)
LYMPHOCYTES NFR BLD: 38 % (ref 13–44)
MCH RBC QN AUTO: 31.1 PG (ref 26.1–32.9)
MCHC RBC AUTO-ENTMCNC: 33.2 G/DL (ref 31.4–35)
MCV RBC AUTO: 93.7 FL (ref 82–102)
MONOCYTES # BLD: 0.2 K/UL (ref 0.1–1.3)
MONOCYTES NFR BLD: 4 % (ref 4–12)
NEUTS SEG # BLD: 3.2 K/UL (ref 1.7–8.2)
NEUTS SEG NFR BLD: 54 % (ref 43–78)
NRBC # BLD: 0.02 K/UL (ref 0–0.2)
PLATELET # BLD AUTO: 250 K/UL (ref 150–450)
PMV BLD AUTO: 11.3 FL (ref 9.4–12.3)
POTASSIUM SERPL-SCNC: 3.8 MMOL/L (ref 3.5–5.1)
PROT SERPL-MCNC: 8.2 G/DL (ref 6.3–8.2)
RBC # BLD AUTO: 2.86 M/UL (ref 4.05–5.2)
RETICS # AUTO: 0.07 M/UL (ref 0.03–0.1)
RETICS/RBC NFR AUTO: 2.5 % (ref 0.3–2)
SODIUM SERPL-SCNC: 137 MMOL/L (ref 136–146)
URATE SERPL-MCNC: 3 MG/DL (ref 2.6–6)
WBC # BLD AUTO: 6 K/UL (ref 4.3–11.1)

## 2023-12-07 PROCEDURE — 99215 OFFICE O/P EST HI 40 MIN: CPT | Performed by: PEDIATRICS

## 2023-12-07 PROCEDURE — 88184 FLOWCYTOMETRY/ TC 1 MARKER: CPT

## 2023-12-07 PROCEDURE — 88185 FLOWCYTOMETRY/TC ADD-ON: CPT

## 2023-12-07 PROCEDURE — 80053 COMPREHEN METABOLIC PANEL: CPT

## 2023-12-07 PROCEDURE — 82232 ASSAY OF BETA-2 PROTEIN: CPT

## 2023-12-07 PROCEDURE — 36415 COLL VENOUS BLD VENIPUNCTURE: CPT

## 2023-12-07 PROCEDURE — 85025 COMPLETE CBC W/AUTO DIFF WBC: CPT

## 2023-12-07 PROCEDURE — 83010 ASSAY OF HAPTOGLOBIN QUANT: CPT

## 2023-12-07 PROCEDURE — 86880 COOMBS TEST DIRECT: CPT

## 2023-12-07 PROCEDURE — 84550 ASSAY OF BLOOD/URIC ACID: CPT

## 2023-12-07 PROCEDURE — 85046 RETICYTE/HGB CONCENTRATE: CPT

## 2023-12-07 PROCEDURE — 83615 LACTATE (LD) (LDH) ENZYME: CPT

## 2023-12-07 ASSESSMENT — PATIENT HEALTH QUESTIONNAIRE - PHQ9
SUM OF ALL RESPONSES TO PHQ QUESTIONS 1-9: 0
1. LITTLE INTEREST OR PLEASURE IN DOING THINGS: 0
SUM OF ALL RESPONSES TO PHQ QUESTIONS 1-9: 0
2. FEELING DOWN, DEPRESSED OR HOPELESS: 0
SUM OF ALL RESPONSES TO PHQ QUESTIONS 1-9: 0
SUM OF ALL RESPONSES TO PHQ9 QUESTIONS 1 & 2: 0
SUM OF ALL RESPONSES TO PHQ QUESTIONS 1-9: 0

## 2023-12-07 NOTE — PATIENT INSTRUCTIONS
3.0 - 15.9 % Final    Retic Hemoglobin conc. 12/07/2023 45 (H)  29 - 35 pg Final    Uric Acid 12/07/2023 3.0  2.6 - 6.0 MG/DL Final         Treatment Summary has been discussed and given to patient: NA        -------------------------------------------------------------------------------------------------------------------  Please call our office at [phone] if you have any  of the following symptoms:   Fever of 100.5 or greater  Chills  Shortness of breath  Swelling or pain in one leg    After office hours an answering service is available and will contact a provider for emergencies or if you are experiencing any of the above symptoms. Patient My Chart. My Chart log in information explained on the after visit summary printout at the [address] desk.       JAMILA Nurse: Elizabeth Lerma ([phone]) Available Tues-Friday 8a-5pm  The office [facility]) and on call service is available 24/7 ([phone])

## 2023-12-07 NOTE — PROGRESS NOTES
[facility], [place], [place]. Cytometry Part B (Clinical Cytometry) [id number] (2012)            JESSICA TITER AND PATTERN 9/7/22       JESSICA IFA WITH REFLEX TO TITER/PATTERN 9/7/22       PARVOVIRUS B19 IGM 2/7/23    Ref Range & Units 1 mo ago Comments   Parvovirus B19, IgM <0.9 0.1         Reference Range:      <0.9   Negative   0.9-1.1   Equivocal      >1.1   Positive          Results from any one IgM assay should not be used as a sole determinant of a current or recent infection. Because IgM tests can yield false positive results and low levels of IgM antibody may persist for months post infection, reliance on a single test result could be misleading. If an acute infection is suspected, consider obtaining a new specimen and submit for both IgG and IgM testing in two or more weeks. To diagnose   current infection, consider Parvovirus B19 DNA, PCR. TREPONEMA PALLIDUM (TP-PA WITH REFLEX TO RPR) 2/13/23       COPPER 2/13/23       FLEXITEST 13 DONATH LANDSTEINER 2/13/23  Component 1 mo ago Comments   Result/Comment:  Flexitest 13  Flexitest 13   DONATH LANDSTEINER             Miscellaneous Code             Jen Morgan           Negative     Ref: [id number]         SURGICAL PATHOLOGY EXAM 2/21/23  FINAL DIAGNOSIS   BONE MARROW ASPIRATE, CLOT SECTION, AND BIOPSY: TRILINEAGE HEMATOPOIESIS FULL MATURATION   BONE MARROW CELLULARITY ESTIMATED AT LEAST 90%   BLASTS ESTIMATED 3%   INCREASED MEGAKARYOCYTES WITH FOCAL CLUSTERING   ERYTHROID HYPERPLASIA   PERIPHERAL BLOOD: MACROCYTIC PANCYTOPENIA   HYPERSEGMENTED NEUTROPHILS NOTED (GREATER THAN 10%)   COMMENT: The dysmorphic features are subtle, and the peripheral counts are not markedly decreased.  However, if these cytopenias have been or become persistent, and other clinical causes such as nutritional/toxin/drug/chemotherapy/immunodeficiency/autoimmune conditions are excluded, these findings could be compatible

## 2023-12-09 LAB — B2 MICROGLOB SERPL-MCNC: 1.2 MG/L (ref 0.6–2.4)

## 2023-12-11 LAB — CD59 CELLS BLD QL: NORMAL

## 2024-01-09 ENCOUNTER — OFFICE VISIT (OUTPATIENT)
Dept: ONCOLOGY | Age: 24
End: 2024-01-09
Payer: COMMERCIAL

## 2024-01-09 ENCOUNTER — HOSPITAL ENCOUNTER (OUTPATIENT)
Dept: LAB | Age: 24
Discharge: HOME | End: 2024-01-12
Payer: COMMERCIAL

## 2024-01-09 VITALS
SYSTOLIC BLOOD PRESSURE: 107 MMHG | TEMPERATURE: 98.4 F | RESPIRATION RATE: 18 BRPM | HEART RATE: 91 BPM | HEIGHT: 65 IN | DIASTOLIC BLOOD PRESSURE: 73 MMHG | BODY MASS INDEX: 29.67 KG/M2 | WEIGHT: 178.1 LBS | OXYGEN SATURATION: 97 %

## 2024-01-09 DIAGNOSIS — R11.2 NAUSEA AND VOMITING, UNSPECIFIED VOMITING TYPE: ICD-10-CM

## 2024-01-09 DIAGNOSIS — D59.10 AIHA (AUTOIMMUNE HEMOLYTIC ANEMIA) (HCC): ICD-10-CM

## 2024-01-09 DIAGNOSIS — I82.90 THROMBOSIS: ICD-10-CM

## 2024-01-09 DIAGNOSIS — R53.83 FATIGUE, UNSPECIFIED TYPE: ICD-10-CM

## 2024-01-09 DIAGNOSIS — R59.1 LAD (LYMPHADENOPATHY): ICD-10-CM

## 2024-01-09 DIAGNOSIS — R79.89 ELEVATED FERRITIN: ICD-10-CM

## 2024-01-09 DIAGNOSIS — D59.10 AIHA (AUTOIMMUNE HEMOLYTIC ANEMIA) (HCC): Primary | ICD-10-CM

## 2024-01-09 LAB
ALBUMIN SERPL-MCNC: 4.2 G/DL (ref 3.5–5)
ALBUMIN/GLOB SERPL: 1.1 (ref 0.4–1.6)
ALP SERPL-CCNC: 86 U/L (ref 50–136)
ALT SERPL-CCNC: 75 U/L (ref 12–65)
ANION GAP SERPL CALC-SCNC: 4 MMOL/L (ref 2–11)
AST SERPL-CCNC: 245 U/L (ref 15–37)
BASOPHILS # BLD: 0 K/UL (ref 0–0.2)
BASOPHILS NFR BLD: 0 % (ref 0–2)
BILIRUB SERPL-MCNC: 1.3 MG/DL (ref 0.2–1.1)
BUN SERPL-MCNC: 9 MG/DL (ref 6–23)
CALCIUM SERPL-MCNC: 8.5 MG/DL (ref 8.3–10.4)
CEA SERPL-MCNC: 3.9 NG/ML (ref 0–3)
CHLORIDE SERPL-SCNC: 106 MMOL/L (ref 103–113)
CO2 SERPL-SCNC: 28 MMOL/L (ref 21–32)
CREAT SERPL-MCNC: 0.7 MG/DL (ref 0.6–1)
DIFFERENTIAL METHOD BLD: ABNORMAL
EOSINOPHIL # BLD: 0.1 K/UL (ref 0–0.8)
EOSINOPHIL NFR BLD: 2 % (ref 0.5–7.8)
FERRITIN SERPL-MCNC: 794 NG/ML (ref 8–388)
GLOBULIN SER CALC-MCNC: 3.7 G/DL (ref 2.8–4.5)
GLUCOSE SERPL-MCNC: 93 MG/DL (ref 65–100)
HAV IGM SER QL: NONREACTIVE
HBV CORE IGM SER QL: NONREACTIVE
HBV SURFACE AG SER QL: NONREACTIVE
HCT VFR BLD AUTO: 22.6 % (ref 35.8–46.3)
HCV AB SER QL: NONREACTIVE
HGB BLD-MCNC: 7.7 G/DL (ref 11.7–15.4)
HGB RETIC QN AUTO: 41 PG (ref 29–35)
HIV 1+2 AB+HIV1 P24 AG SERPL QL IA: NONREACTIVE
HIV 1/2 RESULT COMMENT: NORMAL
IMM GRANULOCYTES # BLD AUTO: 0.1 K/UL (ref 0–0.5)
IMM GRANULOCYTES NFR BLD AUTO: 1 % (ref 0–5)
IMM RETICS NFR: 10.6 % (ref 3–15.9)
IRON SATN MFR SERPL: 78 %
IRON SERPL-MCNC: 182 UG/DL (ref 35–150)
LDH SERPL L TO P-CCNC: >4000 U/L (ref 100–190)
LYMPHOCYTES # BLD: 1.8 K/UL (ref 0.5–4.6)
LYMPHOCYTES NFR BLD: 36 % (ref 13–44)
Lab: NORMAL
MCH RBC QN AUTO: 34.1 PG (ref 26.1–32.9)
MCHC RBC AUTO-ENTMCNC: 34.1 G/DL (ref 31.4–35)
MCV RBC AUTO: 100 FL (ref 82–102)
MONOCYTES # BLD: 0.1 K/UL (ref 0.1–1.3)
MONOCYTES NFR BLD: 3 % (ref 4–12)
NEUTS SEG # BLD: 2.8 K/UL (ref 1.7–8.2)
NEUTS SEG NFR BLD: 58 % (ref 43–78)
NRBC # BLD: 0 K/UL (ref 0–0.2)
PLATELET # BLD AUTO: 336 K/UL (ref 150–450)
PLATELET COMMENT: ADEQUATE
PMV BLD AUTO: 11.2 FL (ref 9.4–12.3)
POTASSIUM SERPL-SCNC: 3.4 MMOL/L (ref 3.5–5.1)
PROT SERPL-MCNC: 7.9 G/DL (ref 6.3–8.2)
RBC # BLD AUTO: 2.26 M/UL (ref 4.05–5.2)
RBC MORPH BLD: ABNORMAL
REFERENCE LAB: NORMAL
RETICS # AUTO: 0.04 M/UL (ref 0.03–0.1)
RETICS/RBC NFR AUTO: 1.6 % (ref 0.3–2)
SODIUM SERPL-SCNC: 138 MMOL/L (ref 136–146)
TIBC SERPL-MCNC: 232 UG/DL (ref 250–450)
WBC # BLD AUTO: 4.9 K/UL (ref 4.3–11.1)
WBC MORPH BLD: ABNORMAL

## 2024-01-09 PROCEDURE — 83540 ASSAY OF IRON: CPT

## 2024-01-09 PROCEDURE — 80053 COMPREHEN METABOLIC PANEL: CPT

## 2024-01-09 PROCEDURE — 99215 OFFICE O/P EST HI 40 MIN: CPT | Performed by: PEDIATRICS

## 2024-01-09 PROCEDURE — 85025 COMPLETE CBC W/AUTO DIFF WBC: CPT

## 2024-01-09 PROCEDURE — 87389 HIV-1 AG W/HIV-1&-2 AB AG IA: CPT

## 2024-01-09 PROCEDURE — 88185 FLOWCYTOMETRY/TC ADD-ON: CPT

## 2024-01-09 PROCEDURE — 88188 FLOWCYTOMETRY/READ 9-15: CPT

## 2024-01-09 PROCEDURE — 83550 IRON BINDING TEST: CPT

## 2024-01-09 PROCEDURE — 83615 LACTATE (LD) (LDH) ENZYME: CPT

## 2024-01-09 PROCEDURE — 36415 COLL VENOUS BLD VENIPUNCTURE: CPT

## 2024-01-09 PROCEDURE — 88184 FLOWCYTOMETRY/ TC 1 MARKER: CPT

## 2024-01-09 PROCEDURE — 80074 ACUTE HEPATITIS PANEL: CPT

## 2024-01-09 PROCEDURE — 85046 RETICYTE/HGB CONCENTRATE: CPT

## 2024-01-09 PROCEDURE — 87798 DETECT AGENT NOS DNA AMP: CPT

## 2024-01-09 PROCEDURE — 82378 CARCINOEMBRYONIC ANTIGEN: CPT

## 2024-01-09 PROCEDURE — 82728 ASSAY OF FERRITIN: CPT

## 2024-01-09 RX ORDER — PROCHLORPERAZINE MALEATE 10 MG
10 TABLET ORAL EVERY 6 HOURS PRN
Qty: 90 TABLET | Refills: 0 | Status: SHIPPED | OUTPATIENT
Start: 2024-01-09

## 2024-01-09 RX ORDER — ONDANSETRON HYDROCHLORIDE 8 MG/1
8 TABLET, FILM COATED ORAL EVERY 8 HOURS PRN
Qty: 90 TABLET | Refills: 5 | Status: SHIPPED | OUTPATIENT
Start: 2024-01-09

## 2024-01-09 RX ORDER — MYCOPHENOLATE MOFETIL 500 MG/1
500 TABLET ORAL 2 TIMES DAILY
Qty: 60 TABLET | Refills: 3 | Status: SHIPPED | OUTPATIENT
Start: 2024-01-09

## 2024-01-09 RX ORDER — PREDNISONE 20 MG/1
TABLET ORAL
Qty: 42 TABLET | Refills: 0 | Status: SHIPPED | OUTPATIENT
Start: 2024-01-09 | End: 2024-01-12

## 2024-01-09 ASSESSMENT — PATIENT HEALTH QUESTIONNAIRE - PHQ9
SUM OF ALL RESPONSES TO PHQ QUESTIONS 1-9: 0
2. FEELING DOWN, DEPRESSED OR HOPELESS: 0
SUM OF ALL RESPONSES TO PHQ9 QUESTIONS 1 & 2: 0
1. LITTLE INTEREST OR PLEASURE IN DOING THINGS: 0

## 2024-01-09 NOTE — PATIENT INSTRUCTIONS
Patient Instructions from Today's Visit    Reason for Visit:    Follow up for anemia   S/p Rituxan (weekly x4): August 2023   S/p IVIG and steroids at [facility] Nov/Dec 2023  S/p [facility]   Hx DVT LUE (Dec 2023)      Hx: Dizzy spells, headaches, Anemia starting in 2022  Hx: Blood transfusions     S/p bone marrow aspirate/biopsy at [facility]  S/p repeat BMA/bx at [facility] (July 2023)     Currently taking:   Protonix twice a day   Vitamin B12  Eliquis     Nausea/vomiting (x2 weeks)     Plan:    Your hemoglobin is still low. If it drops lower, you may need a transfusion but not at this time.   Per your report you are off steroids. As we transition to another treatment, Dr Seo wants you to restart steroids.   Prednisone 30mg twice a day for 1 week and then decrease to 30mg by mouth daily.   Restart Protonix    You have a new pt visit with Dr Bermudez scheduled for this Friday, 1-12-24. This is an important visit.     Dr Seo recommends more lab work to be drawn today.     He is starting you on a medication called Cellcept 500mg by mouth every 12 hours unless we discontinue/modify. This will likely go through a speciality pharmacy. If you do not receive a call to set up shipment for this in the next 1-2 week call me to follow up.    This medication can take a little while to kick in which is why we are starting you on steroids in the meantime.   Once we start this we will also put you on a few prophylactic medications like Bactrim, Diflucan, Acyclovir.    There is a possibility of needing a dose of Cytoxan (chemotherapy agent) but we are not starting that process at this time.     Dr Seo does not think we need to repeat the bone marrow at this time.     To address your nausea/vomiting:   -[facility]erral to [facility]   -CT Abdomen/Pelvis in the next day or 2    CT scan needs to be completed prior to starting CellCept     Follow Up:  1 week     Recent Lab Results:  Hospital Outpatient Visit on

## 2024-01-10 NOTE — PROGRESS NOTES
Pt returned call.   Pt states yesterday she was stuck for labs multiple times and got lightheaded. Pt states she went over earlyfor CT scan and was told it was scheduled only without contrast instead of as ordered with and without. She states she was ultimately sent home due to not feeling well..   Pt reports feeling better today and decrease in nausea/vomiting since yesterday. Pt does endorse starting steroids as prescribed and attributes feeling better to the steroids. Pt states she has not needed the additional nausea medication yet.    Pt ok with rescheduling stat CT scan to tomorrow afternoon in [place].   Scheduling notified to reschedule and contact pt with apt.   Pt agreeable.   
Stat CT scan scheduled/worked in for today. Pt and mom aware and agreeable.    Looked for results and noticed scan had been canceled by patient.   Called pt to inquire. No answer. Left voicemail asking for call back. Direct office # provided for call back.     
linked markers (listed below) on red blood cells, monocytes and neutrophils. Sample viability is low (75%). This may affect the detection for a minor PNH clone. Clinical correlation is recommended. Flow Cytometry reviewed by Kaylynn Chávez M.D.   PNH Interpretation   SEE BELOW  No flow cytometric evidence of PNH.   PNH Specimen Type:   SEE BELOW  PERIPHERAL BLOOD   PNH Viability (%) % 75      PNH Population(s) gated:   SEE BELOW  Erythrocytes/Granulocytes/Monocytes   PNH Abnormal Cells Detected:   SEE BELOW  Not Detected   PNH % Abnormal:   None      PNH Flow differential(%):   Not reported      PNH RBC   SEE BELOW  Normal CD59 level   PNH Granulocytes:   SEE BELOW  No decreased/absent expression of GPI-linked marker and FLAER   PNH Monocytes:   SEE BELOW  No decreased/absent expression of GPI-linked marker and FLAER   PNH Number of Markers:   8      PNH Method. & Markers Tested:   SEE BELO  Methodology:     Antibodies used were directed against CD45, glycophorin A, CD59, CD24, CD14, CD15, CD64, as well as FLAER. The above estimated percentages   are based on the scattergram of CD45, forward and/or side scatter or phenotypic cluster analysis. This high-sensitivity assay can detect as few as 1 in 10,000 GPI-deficient cells in a mixed population and can be used for sequential monitoring of disease levels in patients with paroxysmal nocturnal hemoglobinuria (PNH).   Reference: Guidelines for the diagnosis and monitoring of paroxysmal nocturnal hemoglobinuria and related disorders by flow cytometry. Suman WILLIS, et al. Cytometry B Clin Cytom 2010;78(4):211-230.   Practical Guidelines for the High-Sensitivity Detection and Monitoring of Paroxysmal Nocturnal Hemoglobinuria Clones by Flow CytometryTANNA, Jose Aguilar, and [facility], [facility], [place], [place], Jann,   [facility], [facility],

## 2024-01-11 ENCOUNTER — HOSPITAL ENCOUNTER (OUTPATIENT)
Dept: CT IMAGING | Age: 24
Discharge: HOME | End: 2024-01-14
Attending: PEDIATRICS

## 2024-01-11 DIAGNOSIS — R11.2 NAUSEA AND VOMITING, UNSPECIFIED VOMITING TYPE: ICD-10-CM

## 2024-01-11 DIAGNOSIS — D59.10 AIHA (AUTOIMMUNE HEMOLYTIC ANEMIA) (HCC): ICD-10-CM

## 2024-01-11 DIAGNOSIS — R59.1 LAD (LYMPHADENOPATHY): ICD-10-CM

## 2024-01-11 DIAGNOSIS — R53.83 FATIGUE, UNSPECIFIED TYPE: ICD-10-CM

## 2024-01-12 ENCOUNTER — TELEMEDICINE (OUTPATIENT)
Dept: RHEUMATOLOGY | Age: 24
End: 2024-01-12
Payer: COMMERCIAL

## 2024-01-12 DIAGNOSIS — D64.9 ANEMIA, UNSPECIFIED TYPE: Primary | ICD-10-CM

## 2024-01-12 DIAGNOSIS — D59.10 AIHA (AUTOIMMUNE HEMOLYTIC ANEMIA) (HCC): ICD-10-CM

## 2024-01-12 DIAGNOSIS — E55.9 HYPOVITAMINOSIS D: ICD-10-CM

## 2024-01-12 DIAGNOSIS — Z79.899 LONG TERM CURRENT USE OF IMMUNOSUPPRESSIVE DRUG: ICD-10-CM

## 2024-01-12 DIAGNOSIS — R76.0 ANTINUCLEAR ANTIBODY (ANA) TITER GREATER THAN 1:80: ICD-10-CM

## 2024-01-12 DIAGNOSIS — R53.83 OTHER FATIGUE: ICD-10-CM

## 2024-01-12 DIAGNOSIS — Z79.52 LONG TERM (CURRENT) USE OF SYSTEMIC STEROIDS: ICD-10-CM

## 2024-01-12 DIAGNOSIS — Z79.899 HIGH RISK MEDICATION USE: ICD-10-CM

## 2024-01-12 DIAGNOSIS — R89.4 SEROLOGIC ABNORMALITY: ICD-10-CM

## 2024-01-12 LAB
FLOW CYTOMETRY RESULTS: NORMAL
SPECIMEN SOURCE: NORMAL
TEST ORDERED: NORMAL

## 2024-01-12 PROCEDURE — 99205 OFFICE O/P NEW HI 60 MIN: CPT | Performed by: INTERNAL MEDICINE

## 2024-01-12 NOTE — PATIENT INSTRUCTIONS
Labs - Valentine, lupus panel, c3, c4, RF, CCP, hep panel, vit d, APLS panel , anti mitochondrial ab, anti smooth muslce ab, ANCAs, esr, crp   Continue pred 30mg daily for now   Protonix 40mg daily for now   RTC week of Jan 22nd person - with labs -

## 2024-01-12 NOTE — PROGRESS NOTES
Patient is seen as a new consult at the request of Dr. Dyllan Travis Parul Vásquez is a 24 y.o. female who was seen by synchronous (real-time) audio-video technology.    Consent:  She and/or her healthcare decision maker is aware that this patient-initiated Telehealth encounter is a billable service, with coverage as determined by her insurance carrier. She is aware that she may receive a bill and has provided verbal consent to proceed: Yes    I was at home while conducting this encounter.          Subjective:      HPI:    Permanent history    Ms. Vásquez is a very pleasant 24-year-old -American lady who presents with a chief complaint of anemia, fatigue, and joint pain. She reports experiencing symptoms for approximately a year and a half, including excessive sleep, weakness, and fatigue. She was diagnosed with anemia after an ER visit and has been under the care of Dr. Meyer and Dr. Mijares, but the cause of her anemia remains undetermined.    The patient describes additional symptoms of leg pain, difficulty walking and standing for long periods, nausea, and vomiting. She has vomited 8 out of the past 12 days. Joint pain is primarily in her legs, particularly her knees and hands, and tends to worsen when her hemoglobin levels drop. The pain is more severe towards the end of the day, with no visible swelling or redness in the joints. Her knees may feel hot, but there is no visible inflammation. The leg pain is characterized as shooting, stabbing, and throbbing, extending from her thighs down, and is alleviated by rest.    Ms. Vásquez denies experiencing mouth ulcers, hemoptysis, hematochezia, hematuria, or hair loss causing bald patches. She also denies having any rashes, especially in sun-exposed areas, or changes in the color of her fingertips or toes in cold settings. 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 She has been treated with Prednisone,

## 2024-01-13 LAB — B19V DNA SPEC QL NAA+PROBE: NEGATIVE

## 2024-01-16 ENCOUNTER — OFFICE VISIT (OUTPATIENT)
Dept: ONCOLOGY | Age: 24
End: 2024-01-16
Payer: COMMERCIAL

## 2024-01-16 ENCOUNTER — HOSPITAL ENCOUNTER (OUTPATIENT)
Dept: LAB | Age: 24
Discharge: HOME | End: 2024-01-19
Payer: COMMERCIAL

## 2024-01-16 VITALS
HEIGHT: 65 IN | WEIGHT: 179.6 LBS | OXYGEN SATURATION: 99 % | SYSTOLIC BLOOD PRESSURE: 125 MMHG | DIASTOLIC BLOOD PRESSURE: 71 MMHG | TEMPERATURE: 98.5 F | RESPIRATION RATE: 18 BRPM | HEART RATE: 75 BPM | BODY MASS INDEX: 29.92 KG/M2

## 2024-01-16 DIAGNOSIS — R11.2 NAUSEA AND VOMITING, UNSPECIFIED VOMITING TYPE: ICD-10-CM

## 2024-01-16 DIAGNOSIS — R59.1 LAD (LYMPHADENOPATHY): ICD-10-CM

## 2024-01-16 DIAGNOSIS — R53.83 FATIGUE, UNSPECIFIED TYPE: ICD-10-CM

## 2024-01-16 DIAGNOSIS — D59.30 HEMOLYTIC-UREMIC SYNDROME, UNSPECIFIED SUBTYPE (HCC): ICD-10-CM

## 2024-01-16 DIAGNOSIS — D64.9 ANEMIA, UNSPECIFIED TYPE: ICD-10-CM

## 2024-01-16 DIAGNOSIS — D59.10 AIHA (AUTOIMMUNE HEMOLYTIC ANEMIA) (HCC): ICD-10-CM

## 2024-01-16 DIAGNOSIS — I82.90 THROMBOSIS: ICD-10-CM

## 2024-01-16 LAB
25(OH)D3 SERPL-MCNC: 4.3 NG/ML (ref 30–100)
ALBUMIN SERPL-MCNC: 4.5 G/DL (ref 3.5–5)
ALBUMIN/GLOB SERPL: 1.2 (ref 0.4–1.6)
ALP SERPL-CCNC: 82 U/L (ref 50–136)
ALT SERPL-CCNC: 116 U/L (ref 12–65)
ANION GAP SERPL CALC-SCNC: 6 MMOL/L (ref 2–11)
AST SERPL-CCNC: 268 U/L (ref 15–37)
BASOPHILS # BLD: 0 K/UL (ref 0–0.2)
BASOPHILS NFR BLD: 0 % (ref 0–2)
BILIRUB SERPL-MCNC: 1.3 MG/DL (ref 0.2–1.1)
BUN SERPL-MCNC: 9 MG/DL (ref 6–23)
CALCIUM SERPL-MCNC: 8.9 MG/DL (ref 8.3–10.4)
CHLORIDE SERPL-SCNC: 106 MMOL/L (ref 103–113)
CO2 SERPL-SCNC: 27 MMOL/L (ref 21–32)
CREAT SERPL-MCNC: 0.7 MG/DL (ref 0.6–1)
CRP SERPL-MCNC: <0.3 MG/DL (ref 0–0.9)
DIFFERENTIAL METHOD BLD: ABNORMAL
EOSINOPHIL # BLD: 0.1 K/UL (ref 0–0.8)
EOSINOPHIL NFR BLD: 2 % (ref 0.5–7.8)
ERYTHROCYTE [DISTWIDTH] IN BLOOD BY AUTOMATED COUNT: 26.9 % (ref 11.9–14.6)
ERYTHROCYTE [SEDIMENTATION RATE] IN BLOOD: 7 MM/HR (ref 0–20)
GLOBULIN SER CALC-MCNC: 3.8 G/DL (ref 2.8–4.5)
GLUCOSE SERPL-MCNC: 72 MG/DL (ref 65–100)
HAV IGM SER QL: NONREACTIVE
HBV CORE IGM SER QL: NONREACTIVE
HBV SURFACE AG SER QL: NONREACTIVE
HCT VFR BLD AUTO: 34.3 % (ref 35.8–46.3)
HCV AB SER QL: NONREACTIVE
HGB BLD-MCNC: 11.3 G/DL (ref 11.7–15.4)
IMM GRANULOCYTES # BLD AUTO: 0 K/UL (ref 0–0.5)
IMM GRANULOCYTES NFR BLD AUTO: 1 % (ref 0–5)
LYMPHOCYTES # BLD: 2.2 K/UL (ref 0.5–4.6)
LYMPHOCYTES NFR BLD: 49 % (ref 13–44)
MCH RBC QN AUTO: 29.9 PG (ref 26.1–32.9)
MCHC RBC AUTO-ENTMCNC: 32.9 G/DL (ref 31.4–35)
MCV RBC AUTO: 90.7 FL (ref 82–102)
MONOCYTES # BLD: 0.1 K/UL (ref 0.1–1.3)
MONOCYTES NFR BLD: 2 % (ref 4–12)
NEUTS SEG # BLD: 2 K/UL (ref 1.7–8.2)
NEUTS SEG NFR BLD: 46 % (ref 43–78)
NRBC # BLD: 0 K/UL (ref 0–0.2)
PLATELET # BLD AUTO: 229 K/UL (ref 150–450)
PMV BLD AUTO: 11.1 FL (ref 9.4–12.3)
POTASSIUM SERPL-SCNC: 3.2 MMOL/L (ref 3.5–5.1)
PROT SERPL-MCNC: 8.3 G/DL (ref 6.3–8.2)
RBC # BLD AUTO: 3.78 M/UL (ref 4.05–5.2)
SODIUM SERPL-SCNC: 139 MMOL/L (ref 136–146)
WBC # BLD AUTO: 4.4 K/UL (ref 4.3–11.1)

## 2024-01-16 PROCEDURE — 86200 CCP ANTIBODY: CPT

## 2024-01-16 PROCEDURE — 86160 COMPLEMENT ANTIGEN: CPT

## 2024-01-16 PROCEDURE — 86235 NUCLEAR ANTIGEN ANTIBODY: CPT

## 2024-01-16 PROCEDURE — 86225 DNA ANTIBODY NATIVE: CPT

## 2024-01-16 PROCEDURE — 86140 C-REACTIVE PROTEIN: CPT

## 2024-01-16 PROCEDURE — 99215 OFFICE O/P EST HI 40 MIN: CPT | Performed by: PEDIATRICS

## 2024-01-16 PROCEDURE — 85652 RBC SED RATE AUTOMATED: CPT

## 2024-01-16 PROCEDURE — 80053 COMPREHEN METABOLIC PANEL: CPT

## 2024-01-16 PROCEDURE — 86381 MITOCHONDRIAL ANTIBODY EACH: CPT

## 2024-01-16 PROCEDURE — 85670 THROMBIN TIME PLASMA: CPT

## 2024-01-16 PROCEDURE — 83516 IMMUNOASSAY NONANTIBODY: CPT

## 2024-01-16 PROCEDURE — 85705 THROMBOPLASTIN INHIBITION: CPT

## 2024-01-16 PROCEDURE — 85613 RUSSELL VIPER VENOM DILUTED: CPT

## 2024-01-16 PROCEDURE — 86146 BETA-2 GLYCOPROTEIN ANTIBODY: CPT

## 2024-01-16 PROCEDURE — 82306 VITAMIN D 25 HYDROXY: CPT

## 2024-01-16 PROCEDURE — 86037 ANCA TITER EACH ANTIBODY: CPT

## 2024-01-16 PROCEDURE — 85732 THROMBOPLASTIN TIME PARTIAL: CPT

## 2024-01-16 PROCEDURE — 80074 ACUTE HEPATITIS PANEL: CPT

## 2024-01-16 PROCEDURE — 36415 COLL VENOUS BLD VENIPUNCTURE: CPT

## 2024-01-16 PROCEDURE — 85025 COMPLETE CBC W/AUTO DIFF WBC: CPT

## 2024-01-16 RX ORDER — MYCOPHENOLATE MOFETIL 500 MG/1
500 TABLET ORAL 2 TIMES DAILY
Qty: 60 TABLET | Refills: 3 | Status: SHIPPED | OUTPATIENT
Start: 2024-01-16

## 2024-01-16 NOTE — PATIENT INSTRUCTIONS
01/16/2024 2.0  1.7 - 8.2 K/UL Final    Lymphocytes Absolute 01/16/2024 2.2  0.5 - 4.6 K/UL Final    Monocytes Absolute 01/16/2024 0.1  0.1 - 1.3 K/UL Final    Eosinophils Absolute 01/16/2024 0.1  0.0 - 0.8 K/UL Final    Basophils Absolute 01/16/2024 0.0  0.0 - 0.2 K/UL Final    Absolute Immature Granulocyte 01/16/2024 0.0  0.0 - 0.5 K/UL Final    Differential Type 01/16/2024 AUTOMATED    Final    Sodium 01/16/2024 139  136 - 146 mmol/L Final    Potassium 01/16/2024 3.2 (L)  3.5 - 5.1 mmol/L Final    Chloride 01/16/2024 106  103 - 113 mmol/L Final    CO2 01/16/2024 27  21 - 32 mmol/L Final    Anion Gap 01/16/2024 6  2 - 11 mmol/L Final    Glucose 01/16/2024 72  65 - 100 mg/dL Final    BUN 01/16/2024 9  6 - 23 MG/DL Final    Creatinine 01/16/2024 0.70  0.6 - 1.0 MG/DL Final    Est, Glom Filt Rate 01/16/2024 >60  >60 ml/min/1.73m2 Final    Comment:    Pediatric calculator link: https://www.kidney.org/professionals/kdoqi/gfr_calculatorped     These results are not intended for use in patients <18 years of age.     eGFR results are calculated without a race factor using  the 2021 CKD-EPI equation. Careful clinical correlation is recommended, particularly when comparing to results calculated using previous equations.  The CKD-EPI equation is less accurate in patients with extremes of muscle mass, extra-renal metabolism of creatinine, excessive creatine ingestion, or following therapy that affects renal tubular secretion.      Calcium 01/16/2024 8.9  8.3 - 10.4 MG/DL Final    Total Bilirubin 01/16/2024 1.3 (H)  0.2 - 1.1 MG/DL Final    ALT 01/16/2024 116 (H)  12 - 65 U/L Final    AST 01/16/2024 268 (H)  15 - 37 U/L Final    Alk Phosphatase 01/16/2024 82  50 - 136 U/L Final    Total Protein 01/16/2024 8.3 (H)  6.3 - 8.2 g/dL Final    Albumin 01/16/2024 4.5  3.5 - 5.0 g/dL Final    Globulin 01/16/2024 3.8  2.8 - 4.5 g/dL Final    Albumin/Globulin Ratio 01/16/2024 1.2  0.4 - 1.6   Final    Sed Rate, Automated 01/16/2024 7  0

## 2024-01-16 NOTE — PROGRESS NOTES
HISTORY OF PRESENT ILLNESS  Thaddeus Teran is a 23 y.o. y.o. female with hemolytic anemia    ABSTRACT  Reason for Referral: Severe anemia     Referring Provider: Rosalba Meyer MD     Primary Care Provider: CHHAYA Loo NP     Family History of Cancer/Hematologic Disorders: None documented.      Presenting Symptoms: Chronic shortness of breath, fatigue, chest pain, weakness, dizziness, headaches, nosebleeds, inguinal lymphadenopathy, nausea, vomiting, weight loss, and decreased appetite     Narrative with recent with Results/Procedures/Biopsies and Dates completed: Ms. Vásquez is a 23-year-old black female with a history of pineal gland cyst, migraines, auto immune disease, cholecystectomy, and orthopedic surgery. She was evaluated in consultation by Dr. Rosalba Meyer at [facility] on 8/9/22 for severe macrocytic anemia with HGB of 7.3. She reported symptoms of chronic shortness of breath, fatigue, chest pain, weakness, dizziness, headaches, nosebleeds, nausea, vomiting, weight loss, and decreased appetite. She denied any major bleeding and reported irregular menstrual periods. Extensive work-up for anemia was initiated. Labs showed positive urobilinogen, high bilirubin, high LDH, and low haptoglobin. She was started on 60 mg Prednisone for suspected hemolysis. PNH was negative, cold agglutinin was negative, direct Kimberly negative, multiple myeloma negative, hepatitis panel negative, HIV negative. Platelets and white count were normal. JESSICA was positive. CT of the abdomen and pelvis with contrast on 8/22/22 showed no major evidence of abnormalities in the liver or spleen or lymphadenopathy. Dr. Meyer noted that patient was most likely suffering from an autoimmune hemolytic anemia. With prednisone, patient's LDH, retic count, and bilirubin normalized; hemoglobin improved; and macrocytosis resolved. Prednisone was tapered down and then stopped in November of 2022.      Patient was lost to

## 2024-01-16 NOTE — PROGRESS NOTES
SW met with patient following MD appt. No acute needs expressed or identified. SW encouraged patient to call if needs arise. Patient verbalized understanding.    SW remains available.

## 2024-01-16 NOTE — PROGRESS NOTES
Sent CellCept rx to pt's local pharmacy, [facility].   Called [facility] to follow up but unable to get through to pharmacy.   Called pt and updated on rx location. Pt to contact them or go by there tomrorow for update. Pt aware to contact me with any barriers in picking up and starting Cellcept in the next few days. Pt agreeable.

## 2024-01-17 ENCOUNTER — TELEPHONE (OUTPATIENT)
Dept: ONCOLOGY | Age: 24
End: 2024-01-17

## 2024-01-17 DIAGNOSIS — D59.10 AIHA (AUTOIMMUNE HEMOLYTIC ANEMIA) (HCC): Primary | ICD-10-CM

## 2024-01-17 DIAGNOSIS — E55.9 VITAMIN D DEFICIENCY: Primary | ICD-10-CM

## 2024-01-17 DIAGNOSIS — R53.83 FATIGUE, UNSPECIFIED TYPE: ICD-10-CM

## 2024-01-17 DIAGNOSIS — D59.10 AIHA (AUTOIMMUNE HEMOLYTIC ANEMIA) (HCC): ICD-10-CM

## 2024-01-17 DIAGNOSIS — R59.1 LAD (LYMPHADENOPATHY): ICD-10-CM

## 2024-01-17 LAB
C-ANCA TITR SER IF: NORMAL TITER
C3 SERPL-MCNC: 107 MG/DL (ref 82–167)
C4 SERPL-MCNC: 25 MG/DL (ref 12–38)
CCP IGA+IGG SERPL IA-ACNC: 6 UNITS (ref 0–19)
MITOCHONDRIA M2 IGG SER-ACNC: <20 UNITS (ref 0–20)
MYELOPEROXIDASE AB SER IA-ACNC: <0.2 UNITS (ref 0–0.9)
P-ANCA ATYPICAL TITR SER IF: NORMAL TITER
P-ANCA TITR SER IF: NORMAL TITER
PROTEINASE3 AB SER IA-ACNC: <0.2 UNITS (ref 0–0.9)
SMA IGG SER-ACNC: 7 UNITS (ref 0–19)

## 2024-01-17 RX ORDER — ERGOCALCIFEROL 1.25 MG/1
CAPSULE ORAL
Qty: 36 CAPSULE | Refills: 0 | Status: SHIPPED | OUTPATIENT
Start: 2024-01-17

## 2024-01-17 NOTE — TELEPHONE ENCOUNTER
Per Dr Seo:   Vit D 50,000 units 3x weekly x 2 months then weekly  for ~3 months    Called pt to inform. Pt aggreable.   Asked if pt had an updated on cellcept from local pharmacy. Pt states she does not but will go  this rx later today and will ask about cellcept. Pt knows to contact our office with any barriers to being able to /start CellCept.

## 2024-01-18 LAB
APTT SCREEN TO CONFIRM RATIO: 1.1 RATIO (ref 0–1.34)
B2 GLYCOPROT1 IGG SER-ACNC: <9 GPI IGG UNITS (ref 0–20)
B2 GLYCOPROT1 IGM SER-ACNC: <9 GPI IGM UNITS (ref 0–32)
CENTROMERE B AB SER-ACNC: <0.2 AI (ref 0–0.9)
CHROMATIN AB SERPL-ACNC: <0.2 AI (ref 0–0.9)
CONFIRM APTT/NORMAL: 37.8 SEC (ref 0–47.6)
DSDNA AB SER-ACNC: <1 IU/ML (ref 0–9)
ENA JO1 AB SER-ACNC: <0.2 AI (ref 0–0.9)
ENA RNP AB SER-ACNC: <0.2 AI (ref 0–0.9)
ENA SCL70 AB SER-ACNC: <0.2 AI (ref 0–0.9)
ENA SM AB SER-ACNC: <0.2 AI (ref 0–0.9)
ENA SS-A AB SER-ACNC: <0.2 AI (ref 0–0.9)
ENA SS-B AB SER-ACNC: <0.2 AI (ref 0–0.9)
LA 2 SCREEN W REFLEX-IMP: NORMAL
Lab: NORMAL
SCREEN APTT: 27.1 SEC (ref 0–43.5)
SCREEN DRVVT: 35.4 SEC (ref 0–47)
THROMBIN TIME: 16.3 SEC (ref 0–23)

## 2024-01-23 NOTE — PROGRESS NOTES
Received call from pt stating she was told by her local [facility] pharmacy that Cellcept could not be filled through them and it needed to go to a specialty pharmacy.  Reached out to financial team again to follow up on next steps given we sent to [facility] initially.

## 2024-01-24 ENCOUNTER — TELEPHONE (OUTPATIENT)
Dept: ONCOLOGY | Age: 24
End: 2024-01-24

## 2024-01-24 ENCOUNTER — OFFICE VISIT (OUTPATIENT)
Dept: RHEUMATOLOGY | Age: 24
End: 2024-01-24
Payer: COMMERCIAL

## 2024-01-24 VITALS
HEART RATE: 89 BPM | HEIGHT: 65 IN | SYSTOLIC BLOOD PRESSURE: 125 MMHG | DIASTOLIC BLOOD PRESSURE: 74 MMHG | WEIGHT: 173 LBS | BODY MASS INDEX: 28.82 KG/M2

## 2024-01-24 DIAGNOSIS — R53.83 FATIGUE, UNSPECIFIED TYPE: ICD-10-CM

## 2024-01-24 DIAGNOSIS — R53.83 OTHER FATIGUE: ICD-10-CM

## 2024-01-24 DIAGNOSIS — Z79.52 LONG TERM (CURRENT) USE OF SYSTEMIC STEROIDS: ICD-10-CM

## 2024-01-24 DIAGNOSIS — D59.10 AIHA (AUTOIMMUNE HEMOLYTIC ANEMIA) (HCC): ICD-10-CM

## 2024-01-24 DIAGNOSIS — D64.9 ANEMIA, UNSPECIFIED TYPE: Primary | ICD-10-CM

## 2024-01-24 DIAGNOSIS — R89.4 SEROLOGIC ABNORMALITY: ICD-10-CM

## 2024-01-24 DIAGNOSIS — R76.0 ANTINUCLEAR ANTIBODY (ANA) TITER GREATER THAN 1:80: ICD-10-CM

## 2024-01-24 DIAGNOSIS — R11.2 NAUSEA AND VOMITING, UNSPECIFIED VOMITING TYPE: ICD-10-CM

## 2024-01-24 DIAGNOSIS — R59.1 LAD (LYMPHADENOPATHY): ICD-10-CM

## 2024-01-24 DIAGNOSIS — I82.90 THROMBOSIS: ICD-10-CM

## 2024-01-24 DIAGNOSIS — E55.9 HYPOVITAMINOSIS D: ICD-10-CM

## 2024-01-24 DIAGNOSIS — Z79.899 HIGH RISK MEDICATION USE: ICD-10-CM

## 2024-01-24 DIAGNOSIS — Z79.899 LONG TERM CURRENT USE OF IMMUNOSUPPRESSIVE DRUG: ICD-10-CM

## 2024-01-24 PROCEDURE — 99215 OFFICE O/P EST HI 40 MIN: CPT | Performed by: INTERNAL MEDICINE

## 2024-01-24 RX ORDER — MYCOPHENOLATE MOFETIL 500 MG/1
500 TABLET ORAL 2 TIMES DAILY
Qty: 60 TABLET | Refills: 3 | Status: SHIPPED | OUTPATIENT
Start: 2024-01-24

## 2024-01-24 NOTE — TELEPHONE ENCOUNTER
Call to [facility] to clarify insurance is Val. Spoke with Chacha and insurance information relayed. She appreciated the call back

## 2024-01-24 NOTE — PROGRESS NOTES
Received notification from   team that they were informed to send CellCept to [facility] as it was the preferred speciality pharmacy per [employer].   CellCept escribed

## 2024-01-24 NOTE — PROGRESS NOTES
the patient and her mother.   Total visit time   45 minutes, greater than half of the time was spent on counseling.        Plan:       Labs - Valentine, lupus panel, c3, c4, RF, CCP,  APLS panel , anti mitochondrial ab, anti smooth muslce ab, ANCAs, esr, crp before next visit  Reading material on lupus  Vitamin D 50,000 units twice weekly for 12 weeks then once weekly thereafter  RTC in 6 months with labs.  Records from [facility]  Will call and update heme-onc        Please note this document was formulated using dragon voice recognition dictation software.

## 2024-01-24 NOTE — TELEPHONE ENCOUNTER
Physician provider: Syed Seo MD  Reason for today's call: Pt insurance  Last office visit: n/a    Gwendolyn bishop/ [facility] called stating they need to confirm Pt's insurance coverage. She can be reached at: [phone].

## 2024-01-24 NOTE — PATIENT INSTRUCTIONS
Labs - Jessica, lupus panel, c3, c4, RF, CCP,  APLS panel , anti mitochondrial ab, anti smooth muslce ab, ANCAs, esr, crp before next visit  Reading material on lupus  Vitamin D 50,000 units twice weekly for 12 weeks then once weekly thereafter  RTC in 6 months with labs.  Records from [facility]   Will call and update heme      6month,Labs BS prior,[facility]  Aidan- JESSICA, CCP, RF, c3, c4, RF, CCP,  APLS panel , anti mitochondrial ab, anti smooth muslce ab, ANCAs, esr, crp

## 2024-01-29 LAB
Lab: NORMAL
Lab: NORMAL
REFERENCE LAB: NORMAL

## 2024-02-01 LAB
Lab: NORMAL
Lab: NORMAL
REFERENCE LAB: NORMAL

## 2024-02-14 ENCOUNTER — OFFICE VISIT (OUTPATIENT)
Dept: ONCOLOGY | Age: 24
End: 2024-02-14
Payer: COMMERCIAL

## 2024-02-14 ENCOUNTER — HOSPITAL ENCOUNTER (OUTPATIENT)
Dept: LAB | Age: 24
Discharge: HOME | End: 2024-02-17
Payer: COMMERCIAL

## 2024-02-14 VITALS
RESPIRATION RATE: 16 BRPM | SYSTOLIC BLOOD PRESSURE: 112 MMHG | TEMPERATURE: 98 F | OXYGEN SATURATION: 99 % | WEIGHT: 172 LBS | HEART RATE: 80 BPM | HEIGHT: 65 IN | BODY MASS INDEX: 28.66 KG/M2 | DIASTOLIC BLOOD PRESSURE: 79 MMHG

## 2024-02-14 DIAGNOSIS — R59.1 LAD (LYMPHADENOPATHY): ICD-10-CM

## 2024-02-14 DIAGNOSIS — D72.819 LEUKOPENIA, UNSPECIFIED TYPE: ICD-10-CM

## 2024-02-14 DIAGNOSIS — D59.30 HEMOLYTIC-UREMIC SYNDROME, UNSPECIFIED SUBTYPE (HCC): ICD-10-CM

## 2024-02-14 DIAGNOSIS — R53.83 FATIGUE, UNSPECIFIED TYPE: ICD-10-CM

## 2024-02-14 DIAGNOSIS — D59.10 AIHA (AUTOIMMUNE HEMOLYTIC ANEMIA) (HCC): ICD-10-CM

## 2024-02-14 DIAGNOSIS — D69.6 DECREASED PLATELET COUNT (HCC): ICD-10-CM

## 2024-02-14 DIAGNOSIS — D64.9 ANEMIA, UNSPECIFIED TYPE: ICD-10-CM

## 2024-02-14 DIAGNOSIS — D59.10 AIHA (AUTOIMMUNE HEMOLYTIC ANEMIA) (HCC): Primary | ICD-10-CM

## 2024-02-14 LAB
ALBUMIN SERPL-MCNC: 4.4 G/DL (ref 3.5–5)
ALBUMIN/GLOB SERPL: 1.6 (ref 0.4–1.6)
ALP SERPL-CCNC: 82 U/L (ref 50–136)
ALT SERPL-CCNC: 78 U/L (ref 12–65)
ANION GAP SERPL CALC-SCNC: 3 MMOL/L (ref 2–11)
AST SERPL-CCNC: 145 U/L (ref 15–37)
BASOPHILS # BLD: 0 K/UL (ref 0–0.2)
BASOPHILS NFR BLD: 0 % (ref 0–2)
BILIRUB SERPL-MCNC: 1.3 MG/DL (ref 0.2–1.1)
BUN SERPL-MCNC: 8 MG/DL (ref 6–23)
CALCIUM SERPL-MCNC: 9 MG/DL (ref 8.3–10.4)
CHLORIDE SERPL-SCNC: 110 MMOL/L (ref 103–113)
CO2 SERPL-SCNC: 30 MMOL/L (ref 21–32)
CREAT SERPL-MCNC: 0.6 MG/DL (ref 0.6–1)
DIFFERENTIAL METHOD BLD: ABNORMAL
EOSINOPHIL # BLD: 0.2 K/UL (ref 0–0.8)
EOSINOPHIL NFR BLD: 5 % (ref 0.5–7.8)
ERYTHROCYTE [DISTWIDTH] IN BLOOD BY AUTOMATED COUNT: 20 % (ref 11.9–14.6)
GLOBULIN SER CALC-MCNC: 2.8 G/DL (ref 2.8–4.5)
GLUCOSE SERPL-MCNC: 96 MG/DL (ref 65–100)
HCT VFR BLD AUTO: 26.3 % (ref 35.8–46.3)
HGB BLD-MCNC: 8.6 G/DL (ref 11.7–15.4)
HGB RETIC QN AUTO: 35 PG (ref 29–35)
IMM GRANULOCYTES # BLD AUTO: 0 K/UL (ref 0–0.5)
IMM GRANULOCYTES NFR BLD AUTO: 0 % (ref 0–5)
IMM RETICS NFR: 5.1 % (ref 3–15.9)
LDH SERPL L TO P-CCNC: >4000 U/L (ref 100–190)
LYMPHOCYTES # BLD: 1.4 K/UL (ref 0.5–4.6)
LYMPHOCYTES NFR BLD: 42 % (ref 13–44)
MCH RBC QN AUTO: 29.1 PG (ref 26.1–32.9)
MCHC RBC AUTO-ENTMCNC: 32.7 G/DL (ref 31.4–35)
MCV RBC AUTO: 88.9 FL (ref 82–102)
MONOCYTES # BLD: 0.1 K/UL (ref 0.1–1.3)
MONOCYTES NFR BLD: 2 % (ref 4–12)
NEUTS SEG # BLD: 1.6 K/UL (ref 1.7–8.2)
NEUTS SEG NFR BLD: 51 % (ref 43–78)
NRBC # BLD: 0 K/UL (ref 0–0.2)
PLATELET # BLD AUTO: 121 K/UL (ref 150–450)
PLATELET COMMENT: ABNORMAL
PMV BLD AUTO: 13.3 FL (ref 9.4–12.3)
POTASSIUM SERPL-SCNC: 3.1 MMOL/L (ref 3.5–5.1)
PROT SERPL-MCNC: 7.2 G/DL (ref 6.3–8.2)
RBC # BLD AUTO: 2.96 M/UL (ref 4.05–5.2)
RBC MORPH BLD: ABNORMAL
RETICS # AUTO: 0.01 M/UL (ref 0.03–0.1)
RETICS/RBC NFR AUTO: 0.4 % (ref 0.3–2)
SODIUM SERPL-SCNC: 143 MMOL/L (ref 136–146)
WBC # BLD AUTO: 3.3 K/UL (ref 4.3–11.1)
WBC MORPH BLD: ABNORMAL

## 2024-02-14 PROCEDURE — 82668 ASSAY OF ERYTHROPOIETIN: CPT

## 2024-02-14 PROCEDURE — 86870 RBC ANTIBODY IDENTIFICATION: CPT

## 2024-02-14 PROCEDURE — 83615 LACTATE (LD) (LDH) ENZYME: CPT

## 2024-02-14 PROCEDURE — 86860 RBC ANTIBODY ELUTION: CPT

## 2024-02-14 PROCEDURE — 85041 AUTOMATED RBC COUNT: CPT

## 2024-02-14 PROCEDURE — 85025 COMPLETE CBC W/AUTO DIFF WBC: CPT

## 2024-02-14 PROCEDURE — 86900 BLOOD TYPING SEROLOGIC ABO: CPT

## 2024-02-14 PROCEDURE — 86850 RBC ANTIBODY SCREEN: CPT

## 2024-02-14 PROCEDURE — 85046 RETICYTE/HGB CONCENTRATE: CPT

## 2024-02-14 PROCEDURE — 80053 COMPREHEN METABOLIC PANEL: CPT

## 2024-02-14 PROCEDURE — 99215 OFFICE O/P EST HI 40 MIN: CPT | Performed by: PEDIATRICS

## 2024-02-14 PROCEDURE — 36415 COLL VENOUS BLD VENIPUNCTURE: CPT

## 2024-02-14 PROCEDURE — 86901 BLOOD TYPING SEROLOGIC RH(D): CPT

## 2024-02-14 PROCEDURE — 86880 COOMBS TEST DIRECT: CPT

## 2024-02-14 PROCEDURE — 82955 ASSAY OF G6PD ENZYME: CPT

## 2024-02-14 RX ORDER — PREDNISONE 20 MG/1
60 TABLET ORAL 2 TIMES DAILY
Qty: 84 TABLET | Refills: 0 | Status: SHIPPED | OUTPATIENT
Start: 2024-02-14 | End: 2024-02-28

## 2024-02-14 RX ORDER — PANTOPRAZOLE SODIUM 40 MG/1
40 TABLET, DELAYED RELEASE ORAL
Qty: 30 TABLET | Refills: 2 | Status: SHIPPED | OUTPATIENT
Start: 2024-02-14

## 2024-02-14 NOTE — PROGRESS NOTES
HISTORY OF PRESENT ILLNESS  Thaddeus Teran is a 24 y.o. y.o. female with hemolytic anemia    ABSTRACT  Reason for Referral: Severe anemia     Referring Provider: Rosalba Meyer MD     Primary Care Provider: CHHAYA Loo NP     Family History of Cancer/Hematologic Disorders: None documented.      Presenting Symptoms: Chronic shortness of breath, fatigue, chest pain, weakness, dizziness, headaches, nosebleeds, inguinal lymphadenopathy, nausea, vomiting, weight loss, and decreased appetite     Narrative with recent with Results/Procedures/Biopsies and Dates completed: Ms. Vásquez is a 23-year-old black female with a history of pineal gland cyst, migraines, auto immune disease, cholecystectomy, and orthopedic surgery. She was evaluated in consultation by Dr. Rosalba Meyer at [facility] on 8/9/22 for severe macrocytic anemia with HGB of 7.3. She reported symptoms of chronic shortness of breath, fatigue, chest pain, weakness, dizziness, headaches, nosebleeds, nausea, vomiting, weight loss, and decreased appetite. She denied any major bleeding and reported irregular menstrual periods. Extensive work-up for anemia was initiated. Labs showed positive urobilinogen, high bilirubin, high LDH, and low haptoglobin. She was started on 60 mg Prednisone for suspected hemolysis. PNH was negative, cold agglutinin was negative, direct Kimberly negative, multiple myeloma negative, hepatitis panel negative, HIV negative. Platelets and white count were normal. JESSICA was positive. CT of the abdomen and pelvis with contrast on 8/22/22 showed no major evidence of abnormalities in the liver or spleen or lymphadenopathy. Dr. Meyer noted that patient was most likely suffering from an autoimmune hemolytic anemia. With prednisone, patient's LDH, retic count, and bilirubin normalized; hemoglobin improved; and macrocytosis resolved. Prednisone was tapered down and then stopped in November of 2022.      Patient was lost to

## 2024-02-14 NOTE — PATIENT INSTRUCTIONS
Patient Instructions from Today's Visit    Reason for Visit:  Follow up for autoimmune hemolytic anemia      S/p Rituxan (weekly x4): August 2023   S/p IVIG and steroids at [facility] Nov/Dec 2023  [facility]   Hx DVT LUE (Dec 2023)   Hx: Blood transfusions Jan and Feb 2024     S/p bone marrow aspirate/biopsy at [facility]  S/p repeat BMA/bx at [facility] (July 2023)     Currently taking:   Protonix twice a day   Vitamin B12  Eliquis   Prednisone 20mg by mouth twice a day (dosing per PCP)  X CellCept 500mg by mouth twice a day (took for a few doses- stopped due to vomiting)    Currently on antibiotic for bladder infection (Cefdinir    Plan:  Your hemoglobin has dropped back down to 8.6  Your white blood cell count and your platelets have dropped down.  Dr Seo recommends another bone marrow aspirate/biopsy as all 3 of those cell lines have dropped back down again.  We would have our [facility] Interventional Radiology department do the marrow and have our pathologists look at it.     If your repeat marrow, does not show any bone marrow problem, Dr Seo recommends removing your spleen. That would be a surgical procedure called a splenectomy.    If we remove your spleen this may help your hemoglobin to stay up.   If your spleen is removed, you would need immunizations to help prevent certain kinds of infections you would be susceptible to. These would need to be repeated every 3-5 years for the rest of your life. We will start those when you come back in 2 weeks.  We will have you referred to Dr Montana the surgeon to discuss splenectomy     Immunizations for high risk, new splenectomy patients or splenectomy patients never having had initial immunizations after splenectomy:  Initial immunizations:  week 1: Prevnar 20 and Menveo (Group A, C, W, Y) and Bexsero (Group B)  week 4: Bexsero (Group B)  week 8: Menveo    After initial immunizations have been completed, every 5 years patient to receive single dose

## 2024-02-14 NOTE — PROGRESS NOTES
Pt states she took 2-3 doses of Cellcept but did not keep it down (vomiting). Pt discontinued this a couple weeks ago.

## 2024-02-15 LAB
EPO SERPL-ACNC: 58.6 MIU/ML (ref 2.6–18.5)
G6PD BLD QN: 246 U/10E12 RBC (ref 155–399)
Lab: NORMAL
Lab: NORMAL
RBC # BLD AUTO: 3.01 X10E6/UL (ref 3.77–5.28)
REFERENCE LAB: NORMAL

## 2024-02-16 DIAGNOSIS — D59.10 AIHA (AUTOIMMUNE HEMOLYTIC ANEMIA) (HCC): ICD-10-CM

## 2024-02-16 DIAGNOSIS — D59.30 HEMOLYTIC-UREMIC SYNDROME, UNSPECIFIED SUBTYPE (HCC): Primary | ICD-10-CM

## 2024-02-16 DIAGNOSIS — R16.1 SPLENOMEGALY: ICD-10-CM

## 2024-02-16 LAB
Lab: NORMAL
Lab: NORMAL
REFERENCE LAB: NORMAL

## 2024-02-20 ENCOUNTER — HOSPITAL ENCOUNTER (OUTPATIENT)
Dept: CT IMAGING | Age: 24
Discharge: HOME | End: 2024-02-23
Attending: PEDIATRICS
Payer: COMMERCIAL

## 2024-02-20 VITALS
RESPIRATION RATE: 16 BRPM | HEIGHT: 65 IN | WEIGHT: 161 LBS | OXYGEN SATURATION: 98 % | HEART RATE: 80 BPM | DIASTOLIC BLOOD PRESSURE: 66 MMHG | SYSTOLIC BLOOD PRESSURE: 113 MMHG | BODY MASS INDEX: 26.82 KG/M2 | TEMPERATURE: 97.7 F

## 2024-02-20 DIAGNOSIS — D59.10 AIHA (AUTOIMMUNE HEMOLYTIC ANEMIA) (HCC): ICD-10-CM

## 2024-02-20 DIAGNOSIS — D69.6 DECREASED PLATELET COUNT (HCC): ICD-10-CM

## 2024-02-20 DIAGNOSIS — D72.819 LEUKOPENIA, UNSPECIFIED TYPE: ICD-10-CM

## 2024-02-20 LAB
BASOPHILS # BLD: 0 K/UL (ref 0–0.2)
BASOPHILS NFR BLD: 0 % (ref 0–2)
BONE MARROW PREP & WRIGHT STAIN: NORMAL
DIFFERENTIAL METHOD BLD: ABNORMAL
EOSINOPHIL # BLD: 0.1 K/UL (ref 0–0.8)
EOSINOPHIL NFR BLD: 2 % (ref 0.5–7.8)
ERYTHROCYTE [DISTWIDTH] IN BLOOD BY AUTOMATED COUNT: 19.9 % (ref 11.9–14.6)
HCT VFR BLD AUTO: 24.6 % (ref 35.8–46.3)
HGB BLD-MCNC: 8.3 G/DL (ref 11.7–15.4)
IMM GRANULOCYTES # BLD AUTO: 0 K/UL (ref 0–0.5)
IMM GRANULOCYTES NFR BLD AUTO: 0 % (ref 0–5)
LYMPHOCYTES # BLD: 1.7 K/UL (ref 0.5–4.6)
LYMPHOCYTES NFR BLD: 37 % (ref 13–44)
MCH RBC QN AUTO: 29.1 PG (ref 26.1–32.9)
MCHC RBC AUTO-ENTMCNC: 33.7 G/DL (ref 31.4–35)
MCV RBC AUTO: 86.3 FL (ref 82–102)
MONOCYTES # BLD: 0.1 K/UL (ref 0.1–1.3)
MONOCYTES NFR BLD: 2 % (ref 4–12)
NEUTS SEG # BLD: 2.7 K/UL (ref 1.7–8.2)
NEUTS SEG NFR BLD: 59 % (ref 43–78)
NRBC # BLD: 0 K/UL (ref 0–0.2)
PLATELET # BLD AUTO: 179 K/UL (ref 150–450)
PMV BLD AUTO: 9.9 FL (ref 9.4–12.3)
RBC # BLD AUTO: 2.85 M/UL (ref 4.05–5.2)
WBC # BLD AUTO: 4.6 K/UL (ref 4.3–11.1)

## 2024-02-20 PROCEDURE — 88311 DECALCIFY TISSUE: CPT

## 2024-02-20 PROCEDURE — 88313 SPECIAL STAINS GROUP 2: CPT

## 2024-02-20 PROCEDURE — 6360000002 HC RX W HCPCS: Performed by: RADIOLOGY

## 2024-02-20 PROCEDURE — 2500000003 HC RX 250 WO HCPCS: Performed by: PHYSICIAN ASSISTANT

## 2024-02-20 PROCEDURE — 85025 COMPLETE CBC W/AUTO DIFF WBC: CPT

## 2024-02-20 PROCEDURE — 77012 CT SCAN FOR NEEDLE BIOPSY: CPT

## 2024-02-20 PROCEDURE — 88305 TISSUE EXAM BY PATHOLOGIST: CPT

## 2024-02-20 RX ORDER — ERGOCALCIFEROL 1.25 MG/1
50000 CAPSULE ORAL WEEKLY
Qty: 12 CAPSULE | Refills: 0 | Status: SHIPPED | OUTPATIENT
Start: 2024-02-20

## 2024-02-20 RX ORDER — LIDOCAINE HYDROCHLORIDE 20 MG/ML
INJECTION, SOLUTION INFILTRATION; PERINEURAL PRN
Status: COMPLETED | OUTPATIENT
Start: 2024-02-20 | End: 2024-02-20

## 2024-02-20 RX ORDER — MIDAZOLAM HYDROCHLORIDE 2 MG/2ML
INJECTION, SOLUTION INTRAMUSCULAR; INTRAVENOUS PRN
Status: COMPLETED | OUTPATIENT
Start: 2024-02-20 | End: 2024-02-20

## 2024-02-20 RX ORDER — FENTANYL CITRATE 50 UG/ML
INJECTION, SOLUTION INTRAMUSCULAR; INTRAVENOUS PRN
Status: COMPLETED | OUTPATIENT
Start: 2024-02-20 | End: 2024-02-20

## 2024-02-20 RX ADMIN — FENTANYL CITRATE 100 MCG: 50 INJECTION, SOLUTION INTRAMUSCULAR; INTRAVENOUS at 14:07

## 2024-02-20 RX ADMIN — MIDAZOLAM HYDROCHLORIDE 2 MG: 1 INJECTION, SOLUTION INTRAMUSCULAR; INTRAVENOUS at 14:07

## 2024-02-20 RX ADMIN — LIDOCAINE HYDROCHLORIDE 5 ML: 20 INJECTION, SOLUTION INFILTRATION; PERINEURAL at 14:12

## 2024-02-20 RX ADMIN — FENTANYL CITRATE 50 MCG: 50 INJECTION, SOLUTION INTRAMUSCULAR; INTRAVENOUS at 14:12

## 2024-02-20 RX ADMIN — MIDAZOLAM HYDROCHLORIDE 1 MG: 1 INJECTION, SOLUTION INTRAMUSCULAR; INTRAVENOUS at 14:12

## 2024-02-20 NOTE — OR NURSING
TRANSFER - OUT REPORT:           Verbal report given to LEVON Monahan(name) on Thaddeus Vásquez  being transferred to IR Recovery 3(unit) for  routine post-op            Report consisted of patient’s Situation, Background, Assessment and      Recommendations(SBAR).          Information from the following report(s) SBAR, Procedure Summary, and MAR was reviewed with the receiving nurse.       Opportunity for questions and clarification was provided.          Conscious Sedation:    150 Mcg of Fentanyl administered   3 Mg of Versed administered   50 Mg of Benadryl administered        Pt tolerated procedure well.      Peripheral Intravenous Line:   Peripheral IV 02/20/24 Left;Proximal;Anterior Forearm (Active)       VITALS:  /72   Pulse 89   Temp 97.7 °F (36.5 °C) (Infrared)   Resp 16   Ht 1.651 m (5' 5\")   Wt 73 kg (161 lb)   SpO2 99%   BMI 26.79 kg/m²

## 2024-02-20 NOTE — OR NURSING
Recovery period without difficulty. Pt alert and oriented and denies pain. Dressing is clean, dry, and intact. Reviewed discharge instructions with patient and visitors, both verbalized understanding. [facility] Vital signs and Salena score completed.

## 2024-02-20 NOTE — BRIEF OP NOTE
[facility]  Department of Interventional Radiology  [phone]        Interventional Radiology Brief Procedure Note    Patient: Thaddeus Vásquez MRN: [id number]  SSN: [id number]    YOB: 2000  Age: 24 y.o.  Sex: female      Date of Procedure: 2/20/2024    Pre-Procedure Diagnosis: anemia    Post-Procedure Diagnosis: SAME    Procedure(s): Image Guided Biopsy    Brief Description of Procedure: CT guided BMBX    Performed By: Frances Kwan PA-C     Assistants: None    Anesthesia:Moderate Sedation TANNA Seaman MD    Estimated Blood Loss: Less than 10ml    Specimens:   to lab    Implants:  None    Findings: no post biopsy bleeding    Complications: None    Recommendations: routine wound care     Follow Up: prn    Signed By: Frances Kwan PA-C     February 20, 2024

## 2024-02-20 NOTE — PRE SEDATION
Sedation Pre-Procedure Note    Patient Name: Thaddeus Vásquez   YOB: 2000  Room/Bed: Room/bed info not found  Medical Record Number: [id number]  Date: 2/20/2024   Time: 1:57 PM       Indication:  hemolytic anemia    Consent: I have discussed with the patient and/or the patient representative the indication, alternatives, and the possible risks and/or complications of the planned procedure and the anesthesia methods. The patient and/or patient representative appear to understand and agree to proceed.    Vital Signs:   Vitals:    02/20/24 1323   BP: 123/73   Pulse: 77   Resp: 16   Temp: 97.7 °F (36.5 °C)   SpO2: 100%       Past Medical History:   has a past medical history of Hemolytic anemia (HCC), History of autoimmune disease, Migraines, and Pineal gland cyst.    Past Surgical History:   has a past surgical history that includes Shoulder arthroscopy (Right, 10/21/2019); Cholecystectomy (2015); bone marrow biopsy (N/A, 7/24/2023); US I&D BREAST ABSCESS DEEP (6/29/2023); and US I&D BREAST ABSCESS DEEP (2/28/2023).    Medications:   Scheduled Meds:   Continuous Infusions:   PRN Meds:   Home Meds:   Prior to Admission medications    Medication Sig Start Date End Date Taking? Authorizing Provider   vitamin D (ERGOCALCIFEROL) 1.25 MG (02759 UT) CAPS capsule Take 1 capsule by mouth once a week 2/20/24   Isabel Bermudez MD   predniSONE (DELTASONE) 20 MG tablet Take 3 tablets by mouth 2 times daily for 14 days 2/14/24 2/28/24  Syed Seo MD   pantoprazole (PROTONIX) 40 MG tablet Take 1 tablet by mouth every morning (before breakfast) 2/14/24   Syed Seo MD   mycophenolate (CELLCEPT) 500 MG tablet Take 1 tablet by mouth 2 times daily 1/24/24   Syed Seo MD   vitamin D (ERGOCALCIFEROL) 1.25 MG (02244 UT) CAPS capsule 1 tablet by mouth 3 times per week (M/W/F) for 8 weeks. Then 1 tablet by mouth weekly for ~12 weeks 1/17/24   Syed Seo MD   apixaban (ELIQUIS) 5 MG

## 2024-02-20 NOTE — DISCHARGE INSTRUCTIONS
If you have any questions about your procedure, please call the Interventional Radiology department at [phone].      After business hours (5pm) and weekends, call the answering service at [phone] and ask for the Radiologist on call to be paged.            Si tiene Preguntas acerca del procedimiento, por favor llame al departamento de Radiología Intervencional al [phone].      Después de horas de oficina (5 pm) y los fines de semana, llamar al servicio de llamadas al [phone] y pregunte por el Radiologo de river.

## 2024-02-28 ENCOUNTER — OFFICE VISIT (OUTPATIENT)
Dept: ONCOLOGY | Age: 24
End: 2024-02-28
Payer: COMMERCIAL

## 2024-02-28 ENCOUNTER — HOSPITAL ENCOUNTER (OUTPATIENT)
Dept: INFUSION THERAPY | Age: 24
Setting detail: INFUSION SERIES
Discharge: HOME | End: 2024-02-28

## 2024-02-28 ENCOUNTER — HOSPITAL ENCOUNTER (OUTPATIENT)
Dept: LAB | Age: 24
Discharge: HOME | End: 2024-03-02
Payer: COMMERCIAL

## 2024-02-28 VITALS
WEIGHT: 168.6 LBS | DIASTOLIC BLOOD PRESSURE: 79 MMHG | HEART RATE: 117 BPM | SYSTOLIC BLOOD PRESSURE: 114 MMHG | RESPIRATION RATE: 18 BRPM | TEMPERATURE: 98.4 F | HEIGHT: 65 IN | BODY MASS INDEX: 28.09 KG/M2 | OXYGEN SATURATION: 98 %

## 2024-02-28 DIAGNOSIS — D59.10 AIHA (AUTOIMMUNE HEMOLYTIC ANEMIA) (HCC): ICD-10-CM

## 2024-02-28 DIAGNOSIS — D59.10 AIHA (AUTOIMMUNE HEMOLYTIC ANEMIA) (HCC): Primary | ICD-10-CM

## 2024-02-28 DIAGNOSIS — R16.1 SPLENOMEGALY: ICD-10-CM

## 2024-02-28 DIAGNOSIS — G47.9 DIFFICULTY SLEEPING: Primary | ICD-10-CM

## 2024-02-28 DIAGNOSIS — Z79.52 CURRENT USE OF STEROID MEDICATION: ICD-10-CM

## 2024-02-28 LAB
ALBUMIN SERPL-MCNC: 4.5 G/DL (ref 3.5–5)
ALBUMIN/GLOB SERPL: 1.4 (ref 0.4–1.6)
ALP SERPL-CCNC: 76 U/L (ref 50–136)
ALT SERPL-CCNC: 110 U/L (ref 12–65)
ANION GAP SERPL CALC-SCNC: 5 MMOL/L (ref 2–11)
AST SERPL-CCNC: 278 U/L (ref 15–37)
BASOPHILS # BLD: 0 K/UL (ref 0–0.2)
BASOPHILS NFR BLD: 0 % (ref 0–2)
BILIRUB SERPL-MCNC: 1 MG/DL (ref 0.2–1.1)
BUN SERPL-MCNC: 10 MG/DL (ref 6–23)
CALCIUM SERPL-MCNC: 8.9 MG/DL (ref 8.3–10.4)
CHLORIDE SERPL-SCNC: 107 MMOL/L (ref 103–113)
CO2 SERPL-SCNC: 27 MMOL/L (ref 21–32)
CREAT SERPL-MCNC: 0.5 MG/DL (ref 0.6–1)
DIFFERENTIAL METHOD BLD: ABNORMAL
EOSINOPHIL # BLD: 0.1 K/UL (ref 0–0.8)
EOSINOPHIL NFR BLD: 1 % (ref 0.5–7.8)
ERYTHROCYTE [DISTWIDTH] IN BLOOD BY AUTOMATED COUNT: 19.5 % (ref 11.9–14.6)
GLOBULIN SER CALC-MCNC: 3.2 G/DL (ref 2.8–4.5)
GLUCOSE SERPL-MCNC: 86 MG/DL (ref 65–100)
HCT VFR BLD AUTO: 24.3 % (ref 35.8–46.3)
HGB BLD-MCNC: 8.1 G/DL (ref 11.7–15.4)
HGB RETIC QN AUTO: 35 PG (ref 29–35)
IMM GRANULOCYTES # BLD AUTO: 0 K/UL (ref 0–0.5)
IMM GRANULOCYTES NFR BLD AUTO: 0 % (ref 0–5)
IMM RETICS NFR: 9.7 % (ref 3–15.9)
LYMPHOCYTES # BLD: 2.4 K/UL (ref 0.5–4.6)
LYMPHOCYTES NFR BLD: 48 % (ref 13–44)
Lab: NORMAL
Lab: NORMAL
MCH RBC QN AUTO: 28.8 PG (ref 26.1–32.9)
MCHC RBC AUTO-ENTMCNC: 33.3 G/DL (ref 31.4–35)
MCV RBC AUTO: 86.5 FL (ref 82–102)
MONOCYTES # BLD: 0.1 K/UL (ref 0.1–1.3)
MONOCYTES NFR BLD: 2 % (ref 4–12)
NEUTS SEG # BLD: 2.4 K/UL (ref 1.7–8.2)
NEUTS SEG NFR BLD: 49 % (ref 43–78)
NRBC # BLD: 0 K/UL (ref 0–0.2)
PLATELET # BLD AUTO: 177 K/UL (ref 150–450)
PLATELET COMMENT: ADEQUATE
PMV BLD AUTO: 11.5 FL (ref 9.4–12.3)
POTASSIUM SERPL-SCNC: 3.3 MMOL/L (ref 3.5–5.1)
PROT SERPL-MCNC: 7.7 G/DL (ref 6.3–8.2)
RBC # BLD AUTO: 2.81 M/UL (ref 4.05–5.2)
RBC MORPH BLD: ABNORMAL
REFERENCE LAB: NORMAL
RETICS # AUTO: 0.01 M/UL (ref 0.03–0.1)
RETICS/RBC NFR AUTO: 0.3 % (ref 0.3–2)
SODIUM SERPL-SCNC: 139 MMOL/L (ref 136–146)
WBC # BLD AUTO: 5 K/UL (ref 4.3–11.1)
WBC MORPH BLD: ABNORMAL

## 2024-02-28 PROCEDURE — 80053 COMPREHEN METABOLIC PANEL: CPT

## 2024-02-28 PROCEDURE — 82390 ASSAY OF CERULOPLASMIN: CPT

## 2024-02-28 PROCEDURE — 36415 COLL VENOUS BLD VENIPUNCTURE: CPT

## 2024-02-28 PROCEDURE — 85025 COMPLETE CBC W/AUTO DIFF WBC: CPT

## 2024-02-28 PROCEDURE — 85046 RETICYTE/HGB CONCENTRATE: CPT

## 2024-02-28 PROCEDURE — 99215 OFFICE O/P EST HI 40 MIN: CPT | Performed by: PEDIATRICS

## 2024-02-28 RX ORDER — EPINEPHRINE 1 MG/ML
0.3 INJECTION, SOLUTION, CONCENTRATE INTRAVENOUS PRN
OUTPATIENT
Start: 2024-03-27

## 2024-02-28 RX ORDER — HYDROCODONE BITARTRATE AND ACETAMINOPHEN 10; 325 MG/1; MG/1
TABLET ORAL
COMMUNITY
Start: 2024-02-19

## 2024-02-28 RX ORDER — EPINEPHRINE 1 MG/ML
0.3 INJECTION, SOLUTION, CONCENTRATE INTRAVENOUS PRN
OUTPATIENT
Start: 2024-02-28

## 2024-02-28 RX ORDER — ONDANSETRON 2 MG/ML
8 INJECTION INTRAMUSCULAR; INTRAVENOUS
OUTPATIENT
Start: 2024-02-28

## 2024-02-28 RX ORDER — DIPHENHYDRAMINE HYDROCHLORIDE 50 MG/ML
50 INJECTION INTRAMUSCULAR; INTRAVENOUS
OUTPATIENT
Start: 2024-03-27

## 2024-02-28 RX ORDER — SODIUM CHLORIDE 9 MG/ML
INJECTION, SOLUTION INTRAVENOUS CONTINUOUS
OUTPATIENT
Start: 2024-03-27

## 2024-02-28 RX ORDER — ALBUTEROL SULFATE 90 UG/1
4 AEROSOL, METERED RESPIRATORY (INHALATION) PRN
OUTPATIENT
Start: 2024-03-27

## 2024-02-28 RX ORDER — SODIUM CHLORIDE 9 MG/ML
INJECTION, SOLUTION INTRAVENOUS CONTINUOUS
OUTPATIENT
Start: 2024-02-28

## 2024-02-28 RX ORDER — ZOLPIDEM TARTRATE 5 MG/1
5 TABLET ORAL NIGHTLY PRN
Qty: 30 TABLET | Refills: 0 | Status: SHIPPED | OUTPATIENT
Start: 2024-02-28 | End: 2024-03-29

## 2024-02-28 RX ORDER — ACETAMINOPHEN 325 MG/1
650 TABLET ORAL
OUTPATIENT
Start: 2024-03-27

## 2024-02-28 RX ORDER — ACETAMINOPHEN 325 MG/1
650 TABLET ORAL
OUTPATIENT
Start: 2024-02-28

## 2024-02-28 RX ORDER — DIPHENHYDRAMINE HYDROCHLORIDE 50 MG/ML
50 INJECTION INTRAMUSCULAR; INTRAVENOUS
OUTPATIENT
Start: 2024-02-28

## 2024-02-28 RX ORDER — ALBUTEROL SULFATE 90 UG/1
4 AEROSOL, METERED RESPIRATORY (INHALATION) PRN
OUTPATIENT
Start: 2024-02-28

## 2024-02-28 RX ORDER — ONDANSETRON 2 MG/ML
8 INJECTION INTRAMUSCULAR; INTRAVENOUS
OUTPATIENT
Start: 2024-03-27

## 2024-02-28 ASSESSMENT — PATIENT HEALTH QUESTIONNAIRE - PHQ9
SUM OF ALL RESPONSES TO PHQ QUESTIONS 1-9: 0
SUM OF ALL RESPONSES TO PHQ9 QUESTIONS 1 & 2: 0
SUM OF ALL RESPONSES TO PHQ QUESTIONS 1-9: 0
1. LITTLE INTEREST OR PLEASURE IN DOING THINGS: 0
SUM OF ALL RESPONSES TO PHQ QUESTIONS 1-9: 0
2. FEELING DOWN, DEPRESSED OR HOPELESS: 0
SUM OF ALL RESPONSES TO PHQ QUESTIONS 1-9: 0

## 2024-02-28 NOTE — PATIENT INSTRUCTIONS
Patient Instructions from Today's Visit    Reason for Visit:  Follow up for autoimmune hemolytic anemia      S/p Rituxan (weekly x4): August 2023   S/p [facility]IG and steroids at [facility] Nov/Dec 2023  [facility]   Hx DVT LUE (Dec 2023)   Hx: Blood transfusions Jan and Feb 2024     S/p bone marrow aspirate/biopsy at [facility]  S/p repeat BMA/bx at [facility] (July 2023 and Feb 2023)     Currently taking:   Protonix twice a day   Vitamin B12  Eliquis (off currently -due to $ and insurance)  Prednisone 60mg by mouth twice a day   X CellCept 500mg by mouth twice a day (took for a few doses- stopped due to vomiting)     Plan:  Your bone marrow initial results looked good.   They are asking for a 2nd opinion/review of the marrow though.     Dr Seo has re reviewed your work up, symptoms, etc.    Your liver function tests are a little elevated but ok.   Your hemoglobin is stable.     We will ask for more lab work and a 24 hour urine for copper to be completed.     We are still working on the dx so do not want your spleen to be removed. You can go ahead and see Dr Montana next week as scheduled to establish.     We will refer you back to ophthalmology for a full exam including a slit lamp exam looking for Yoon Fleischer Rings which may be consistent with Davis's Disease which he is suspicious of.   We will try [facility] but if your insurance does not agree,  we will try [facility].     He may recommend you getting a liver biopsy as well either in interventional radiology or with Dr Montana.     You have been contacted by [facility] for another appointment in their hematology system. We had asked for a specific referral to Dr Bailey. Dr Seo will call Dr Bailey specifically about our request.     To impact your hemoglobin, it sounds like yall are in agreement about restarting Rituxan weekly x4. You will do this in [place] per your preference. You are scheduled to see Dr Meyer this Friday.     Get your repeat ultrasound

## 2024-02-28 NOTE — PROGRESS NOTES
HISTORY OF PRESENT ILLNESS  Thaddeus Teran is a 24 y.o. y.o. female with hemolytic anemia    ABSTRACT  Reason for Referral: Severe anemia     Referring Provider: Rosalba Meyer MD     Primary Care Provider: CHHAYA Loo NP     Family History of Cancer/Hematologic Disorders: None documented.      Presenting Symptoms: Chronic shortness of breath, fatigue, chest pain, weakness, dizziness, headaches, nosebleeds, inguinal lymphadenopathy, nausea, vomiting, weight loss, and decreased appetite     Narrative with recent with Results/Procedures/Biopsies and Dates completed: Ms. Vásquez is a 23-year-old black female with a history of pineal gland cyst, migraines, auto immune disease, cholecystectomy, and orthopedic surgery. She was evaluated in consultation by Dr. Rosalba Meyer at [facility] on 8/9/22 for severe macrocytic anemia with HGB of 7.3. She reported symptoms of chronic shortness of breath, fatigue, chest pain, weakness, dizziness, headaches, nosebleeds, nausea, vomiting, weight loss, and decreased appetite. She denied any major bleeding and reported irregular menstrual periods. Extensive work-up for anemia was initiated. Labs showed positive urobilinogen, high bilirubin, high LDH, and low haptoglobin. She was started on 60 mg Prednisone for suspected hemolysis. PNH was negative, cold agglutinin was negative, direct Kimberly negative, multiple myeloma negative, hepatitis panel negative, HIV negative. Platelets and white count were normal. JESSICA was positive. CT of the abdomen and pelvis with contrast on 8/22/22 showed no major evidence of abnormalities in the liver or spleen or lymphadenopathy. Dr. Meyer noted that patient was most likely suffering from an autoimmune hemolytic anemia. With prednisone, patient's LDH, retic count, and bilirubin normalized; hemoglobin improved; and macrocytosis resolved. Prednisone was tapered down and then stopped in November of 2022.      Patient was lost to

## 2024-02-28 NOTE — PROGRESS NOTES
Received notification that pt was stuck x2 in lab after OV and then got sick. Pt declined vaccines today and would like to reschedule.   Infusion scheduling made aware and to reach out to pt to coordinate apt for 3-5 when she is in town for another referral.

## 2024-02-29 LAB — CERULOPLASMIN SERPL-MCNC: 19.4 MG/DL (ref 19–39)

## 2024-02-29 NOTE — PROGRESS NOTES
Called pt to follow up on vaccine reschedule (pt unable to complete yesterday), starting B12 1000mcg PO QD and Folic Acid 1mg QD.   No answer. Left voicemail asking for pt to call back.

## 2024-03-01 ENCOUNTER — TELEPHONE (OUTPATIENT)
Dept: ONCOLOGY | Age: 24
End: 2024-03-01

## 2024-03-01 DIAGNOSIS — D69.6 DECREASED PLATELET COUNT (HCC): ICD-10-CM

## 2024-03-01 DIAGNOSIS — E53.8 LOW FOLATE: ICD-10-CM

## 2024-03-01 DIAGNOSIS — D64.9 ANEMIA, UNSPECIFIED TYPE: ICD-10-CM

## 2024-03-01 DIAGNOSIS — D59.10 AIHA (AUTOIMMUNE HEMOLYTIC ANEMIA) (HCC): Primary | ICD-10-CM

## 2024-03-01 DIAGNOSIS — E53.8 LOW SERUM VITAMIN B12: ICD-10-CM

## 2024-03-01 RX ORDER — FOLIC ACID 1 MG/1
1 TABLET ORAL DAILY
Qty: 90 TABLET | Refills: 1 | Status: SHIPPED | OUTPATIENT
Start: 2024-03-01

## 2024-03-01 RX ORDER — CHOLECALCIFEROL (VITAMIN D3) 125 MCG
1000 CAPSULE ORAL DAILY
Qty: 60 TABLET | Refills: 3 | Status: SHIPPED | OUTPATIENT
Start: 2024-03-01 | End: 2025-03-01

## 2024-03-01 NOTE — TELEPHONE ENCOUNTER
Multiple attempts by this RN and infusion schedulers over the last few days to reach pt. No answer. Voicemails left.   Called pt's mom. No answer. Left voicemail.     With return call will attempt to reschedule pt's vaccines to 3-5-24 before or after consult with Dr Montana.  Will also recommend Vit B12 1000mcg by mouth daily and Folic Acid 1mg by mouth daily per Dr Seo. Sent rxs to pt's local pharmacy.

## 2024-03-02 LAB
Lab: NORMAL
Lab: NORMAL
REFERENCE LAB: NORMAL

## 2024-03-04 LAB
FLOW CYTOMETRY RESULTS: NORMAL
SPECIMEN SOURCE: NORMAL
TEST ORDERED: NORMAL

## 2024-03-05 LAB
Lab: NORMAL
Lab: NORMAL
REFERENCE LAB: NORMAL

## 2024-03-26 LAB
Lab: NORMAL
Lab: NORMAL
REFERENCE LAB: NORMAL

## 2024-03-28 ENCOUNTER — HOSPITAL ENCOUNTER (OUTPATIENT)
Dept: LAB | Age: 24
Discharge: HOME | End: 2024-03-28
Payer: COMMERCIAL

## 2024-03-28 DIAGNOSIS — D59.10 AIHA (AUTOIMMUNE HEMOLYTIC ANEMIA) (HCC): ICD-10-CM

## 2024-03-28 LAB
Lab: NORMAL
Lab: NORMAL
REFERENCE LAB: NORMAL

## 2024-03-28 PROCEDURE — 82525 ASSAY OF COPPER: CPT

## 2024-03-28 PROCEDURE — 82570 ASSAY OF URINE CREATININE: CPT

## 2024-04-04 DIAGNOSIS — D59.10 AIHA (AUTOIMMUNE HEMOLYTIC ANEMIA) (HCC): Primary | ICD-10-CM

## 2024-04-04 DIAGNOSIS — D64.9 ANEMIA, UNSPECIFIED TYPE: ICD-10-CM

## 2024-04-04 DIAGNOSIS — D69.6 DECREASED PLATELET COUNT (HCC): ICD-10-CM

## 2024-04-10 ENCOUNTER — CLINICAL DOCUMENTATION (OUTPATIENT)
Dept: ONCOLOGY | Age: 24
End: 2024-04-10

## 2024-04-10 ENCOUNTER — HOSPITAL ENCOUNTER (OUTPATIENT)
Dept: LAB | Age: 24
Discharge: HOME | End: 2024-04-13
Payer: COMMERCIAL

## 2024-04-10 ENCOUNTER — OFFICE VISIT (OUTPATIENT)
Dept: ONCOLOGY | Age: 24
End: 2024-04-10
Payer: COMMERCIAL

## 2024-04-10 ENCOUNTER — HOSPITAL ENCOUNTER (OUTPATIENT)
Dept: INFUSION THERAPY | Age: 24
Setting detail: INFUSION SERIES
Discharge: HOME | End: 2024-04-10

## 2024-04-10 VITALS
HEART RATE: 102 BPM | BODY MASS INDEX: 27.32 KG/M2 | RESPIRATION RATE: 16 BRPM | OXYGEN SATURATION: 100 % | HEIGHT: 65 IN | TEMPERATURE: 98.5 F | SYSTOLIC BLOOD PRESSURE: 121 MMHG | DIASTOLIC BLOOD PRESSURE: 69 MMHG | WEIGHT: 164 LBS

## 2024-04-10 DIAGNOSIS — G47.9 DIFFICULTY SLEEPING: ICD-10-CM

## 2024-04-10 DIAGNOSIS — D59.10 AIHA (AUTOIMMUNE HEMOLYTIC ANEMIA) (HCC): Primary | ICD-10-CM

## 2024-04-10 DIAGNOSIS — D69.6 DECREASED PLATELET COUNT (HCC): ICD-10-CM

## 2024-04-10 DIAGNOSIS — Z79.52 CURRENT USE OF STEROID MEDICATION: ICD-10-CM

## 2024-04-10 DIAGNOSIS — D64.9 ANEMIA, UNSPECIFIED TYPE: ICD-10-CM

## 2024-04-10 DIAGNOSIS — D59.10 AIHA (AUTOIMMUNE HEMOLYTIC ANEMIA) (HCC): ICD-10-CM

## 2024-04-10 LAB
ALBUMIN SERPL-MCNC: 4.4 G/DL (ref 3.5–5)
ALBUMIN/GLOB SERPL: 1.7 (ref 0.4–1.6)
ALP SERPL-CCNC: 86 U/L (ref 50–136)
ALT SERPL-CCNC: 24 U/L (ref 12–65)
ANION GAP SERPL CALC-SCNC: 3 MMOL/L (ref 2–11)
AST SERPL-CCNC: 20 U/L (ref 15–37)
BASOPHILS # BLD: 0 K/UL (ref 0–0.2)
BASOPHILS NFR BLD: 0 % (ref 0–2)
BILIRUB SERPL-MCNC: 1.6 MG/DL (ref 0.2–1.1)
BUN SERPL-MCNC: 9 MG/DL (ref 6–23)
CALCIUM SERPL-MCNC: 8.9 MG/DL (ref 8.3–10.4)
CHLORIDE SERPL-SCNC: 109 MMOL/L (ref 103–113)
CO2 SERPL-SCNC: 26 MMOL/L (ref 21–32)
CREAT SERPL-MCNC: 0.6 MG/DL (ref 0.6–1)
DIFFERENTIAL METHOD BLD: ABNORMAL
EOSINOPHIL # BLD: 0.1 K/UL (ref 0–0.8)
EOSINOPHIL NFR BLD: 2 % (ref 0.5–7.8)
GLOBULIN SER CALC-MCNC: 2.6 G/DL (ref 2.8–4.5)
GLUCOSE SERPL-MCNC: 97 MG/DL (ref 65–100)
HCT VFR BLD AUTO: 27.5 % (ref 35.8–46.3)
HGB BLD-MCNC: 9.1 G/DL (ref 11.7–15.4)
HGB RETIC QN AUTO: 41 PG (ref 29–35)
IMM GRANULOCYTES # BLD AUTO: 0 K/UL (ref 0–0.5)
IMM GRANULOCYTES NFR BLD AUTO: 0 % (ref 0–5)
IMM RETICS NFR: 27.1 % (ref 3–15.9)
LYMPHOCYTES # BLD: 1.8 K/UL (ref 0.5–4.6)
LYMPHOCYTES NFR BLD: 25 % (ref 13–44)
MCH RBC QN AUTO: 31.5 PG (ref 26.1–32.9)
MCHC RBC AUTO-ENTMCNC: 33.1 G/DL (ref 31.4–35)
MCV RBC AUTO: 95.2 FL (ref 82–102)
MONOCYTES # BLD: 0.4 K/UL (ref 0.1–1.3)
MONOCYTES NFR BLD: 6 % (ref 4–12)
NEUTS SEG # BLD: 4.8 K/UL (ref 1.7–8.2)
NEUTS SEG NFR BLD: 67 % (ref 43–78)
NRBC # BLD: 0 K/UL (ref 0–0.2)
PLATELET # BLD AUTO: 463 K/UL (ref 150–450)
PLATELET COMMENT: ABNORMAL
PMV BLD AUTO: 9.8 FL (ref 9.4–12.3)
POTASSIUM SERPL-SCNC: 4 MMOL/L (ref 3.5–5.1)
PROT SERPL-MCNC: 7 G/DL (ref 6.3–8.2)
RBC # BLD AUTO: 2.89 M/UL (ref 4.05–5.2)
RBC MORPH BLD: ABNORMAL
RETICS # AUTO: 0.11 M/UL (ref 0.03–0.1)
RETICS/RBC NFR AUTO: 3.7 % (ref 0.3–2)
SODIUM SERPL-SCNC: 138 MMOL/L (ref 136–146)
WBC # BLD AUTO: 7.1 K/UL (ref 4.3–11.1)
WBC MORPH BLD: ABNORMAL

## 2024-04-10 PROCEDURE — 99215 OFFICE O/P EST HI 40 MIN: CPT | Performed by: PEDIATRICS

## 2024-04-10 PROCEDURE — 36415 COLL VENOUS BLD VENIPUNCTURE: CPT

## 2024-04-10 PROCEDURE — 85025 COMPLETE CBC W/AUTO DIFF WBC: CPT

## 2024-04-10 PROCEDURE — 80053 COMPREHEN METABOLIC PANEL: CPT

## 2024-04-10 PROCEDURE — 85046 RETICYTE/HGB CONCENTRATE: CPT

## 2024-04-10 RX ORDER — ZOLPIDEM TARTRATE 5 MG/1
5 TABLET ORAL NIGHTLY PRN
Qty: 30 TABLET | Refills: 0 | Status: SHIPPED | OUTPATIENT
Start: 2024-04-10 | End: 2024-05-10

## 2024-04-10 ASSESSMENT — PATIENT HEALTH QUESTIONNAIRE - PHQ9
SUM OF ALL RESPONSES TO PHQ9 QUESTIONS 1 & 2: 0
SUM OF ALL RESPONSES TO PHQ QUESTIONS 1-9: 0
1. LITTLE INTEREST OR PLEASURE IN DOING THINGS: NOT AT ALL
SUM OF ALL RESPONSES TO PHQ QUESTIONS 1-9: 0
2. FEELING DOWN, DEPRESSED OR HOPELESS: NOT AT ALL

## 2024-04-10 NOTE — PATIENT INSTRUCTIONS
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: March 2024  S/p IVIG and steroids at [facility] Nov/Dec 2023  Hx DVT LUE (Dec 2023)   Hx: Blood transfusions Jan and Feb 2024     S/p bone marrow aspirate/biopsy at [facility]  S/p BMA/bx x3 at (last in Feb 2024)     Currently taking:   Protonix twice a day   Vitamin B12  Eliquis (off currently -due to $ and insurance)  X Prednisone OFF currently   X CellCept 500mg by mouth twice a day (took for a few doses- stopped due to vomiting)      Plan:    Labs/past apts and evaluations/Plan reviewed by Dr Seo  -he does recommend moving forward with the vaccinations as we have discussed in prep for potential splenectomy  -Dr Seo will call Dr Bailey ([facility]) to touch base and he will also talk with Dr Meyer in [place]   -we will send in a 1 month supply of Ambien. You can talk to Dr Meyer about this at your follow up apt if this is helpful/if you want to continue this.     Follow Up:  3 months     Recent Lab Results:  Hospital Outpatient Visit on 04/10/2024   Component Date Value Ref Range Status    Reticulocyte Count,Automated 04/10/2024 3.7 (H)  0.3 - 2.0 % Final    Absolute Retic # 04/10/2024 0.1055 (H)  0.026 - 0.095 M/ul Final    Immature Retic Fraction 04/10/2024 27.1 (H)  3.0 - 15.9 % Final    Retic Hemoglobin conc. 04/10/2024 41 (H)  29 - 35 pg Final    Sodium 04/10/2024 138  136 - 146 mmol/L Final    Potassium 04/10/2024 4.0  3.5 - 5.1 mmol/L Final    Chloride 04/10/2024 109  103 - 113 mmol/L Final    CO2 04/10/2024 26  21 - 32 mmol/L Final    Anion Gap 04/10/2024 3  2 - 11 mmol/L Final    Glucose 04/10/2024 97  65 - 100 mg/dL Final    BUN 04/10/2024 9  6 - 23 MG/DL Final    Creatinine 04/10/2024 0.60  0.6 - 1.0 MG/DL Final    Est, Glom Filt Rate 04/10/2024 >90  >60 ml/min/1.73m2 Final

## 2024-04-10 NOTE — PROGRESS NOTES
FA application provided to patient. No other needs identified or expressed. SW remains available if additional needs arise.

## 2024-04-10 NOTE — PROGRESS NOTES
HISTORY OF PRESENT ILLNESS  Thaddeus Teran is a 24 y.o. y.o. female with hemolytic anemia    ABSTRACT  Reason for Referral: Severe anemia     Referring Provider: Rosalba Meyer MD     Primary Care Provider: CHHAYA Loo NP     Family History of Cancer/Hematologic Disorders: None documented.      Presenting Symptoms: Chronic shortness of breath, fatigue, chest pain, weakness, dizziness, headaches, nosebleeds, inguinal lymphadenopathy, nausea, vomiting, weight loss, and decreased appetite     Narrative with recent with Results/Procedures/Biopsies and Dates completed: Ms. Vásquez is a 23-year-old black female with a history of pineal gland cyst, migraines, auto immune disease, cholecystectomy, and orthopedic surgery. She was evaluated in consultation by Dr. Rosalba Meyer at [facility] on 8/9/22 for severe macrocytic anemia with HGB of 7.3. She reported symptoms of chronic shortness of breath, fatigue, chest pain, weakness, dizziness, headaches, nosebleeds, nausea, vomiting, weight loss, and decreased appetite. She denied any major bleeding and reported irregular menstrual periods. Extensive work-up for anemia was initiated. Labs showed positive urobilinogen, high bilirubin, high LDH, and low haptoglobin. She was started on 60 mg Prednisone for suspected hemolysis. PNH was negative, cold agglutinin was negative, direct Kimberly negative, multiple myeloma negative, hepatitis panel negative, HIV negative. Platelets and white count were normal. JESSICA was positive. CT of the abdomen and pelvis with contrast on 8/22/22 showed no major evidence of abnormalities in the liver or spleen or lymphadenopathy. Dr. Meyer noted that patient was most likely suffering from an autoimmune hemolytic anemia. With prednisone, patient's LDH, retic count, and bilirubin normalized; hemoglobin improved; and macrocytosis resolved. Prednisone was tapered down and then stopped in November of 2022.      Patient was lost to

## 2024-04-11 LAB
Lab: NORMAL
Lab: NORMAL
REFERENCE LAB: NORMAL

## 2024-08-29 ENCOUNTER — OFFICE VISIT (OUTPATIENT)
Dept: ONCOLOGY | Age: 24
End: 2024-08-29
Payer: COMMERCIAL

## 2024-08-29 ENCOUNTER — HOSPITAL ENCOUNTER (OUTPATIENT)
Dept: LAB | Age: 24
Discharge: HOME | End: 2024-08-29
Payer: COMMERCIAL

## 2024-08-29 VITALS
WEIGHT: 167.8 LBS | HEIGHT: 65 IN | HEART RATE: 89 BPM | TEMPERATURE: 98.5 F | SYSTOLIC BLOOD PRESSURE: 115 MMHG | DIASTOLIC BLOOD PRESSURE: 70 MMHG | OXYGEN SATURATION: 99 % | BODY MASS INDEX: 27.96 KG/M2

## 2024-08-29 DIAGNOSIS — D64.9 ANEMIA, UNSPECIFIED TYPE: ICD-10-CM

## 2024-08-29 DIAGNOSIS — D59.10 AIHA (AUTOIMMUNE HEMOLYTIC ANEMIA) (HCC): ICD-10-CM

## 2024-08-29 DIAGNOSIS — Z79.52 CURRENT USE OF STEROID MEDICATION: ICD-10-CM

## 2024-08-29 DIAGNOSIS — D59.10 AIHA (AUTOIMMUNE HEMOLYTIC ANEMIA) (HCC): Primary | ICD-10-CM

## 2024-08-29 DIAGNOSIS — G47.9 DIFFICULTY SLEEPING: ICD-10-CM

## 2024-08-29 LAB
25(OH)D3 SERPL-MCNC: 13.5 NG/ML (ref 30–100)
ALBUMIN SERPL-MCNC: 4.4 G/DL (ref 3.5–5)
ALBUMIN/GLOB SERPL: 2.2 (ref 1–1.9)
ALP SERPL-CCNC: 85 U/L (ref 35–104)
ALT SERPL-CCNC: 49 U/L (ref 12–65)
ANION GAP SERPL CALC-SCNC: 9 MMOL/L (ref 9–18)
AST SERPL-CCNC: 63 U/L (ref 15–37)
BASOPHILS # BLD: 0 K/UL (ref 0–0.2)
BASOPHILS NFR BLD: 0 % (ref 0–2)
BILIRUB SERPL-MCNC: 1.3 MG/DL (ref 0–1.2)
BUN SERPL-MCNC: 10 MG/DL (ref 6–23)
CALCIUM SERPL-MCNC: 9.3 MG/DL (ref 8.8–10.2)
CANCER AG125 SERPL-ACNC: 16 U/ML (ref 0–38)
CHLORIDE SERPL-SCNC: 106 MMOL/L (ref 98–107)
CO2 SERPL-SCNC: 24 MMOL/L (ref 20–28)
CREAT SERPL-MCNC: 0.46 MG/DL (ref 0.6–1.1)
DIFFERENTIAL METHOD BLD: ABNORMAL
EOSINOPHIL # BLD: 0.1 K/UL (ref 0–0.8)
EOSINOPHIL NFR BLD: 2 % (ref 0.5–7.8)
ERYTHROCYTE [DISTWIDTH] IN BLOOD BY AUTOMATED COUNT: 20.5 % (ref 11.9–14.6)
GLOBULIN SER CALC-MCNC: 2 G/DL (ref 2.3–3.5)
GLUCOSE SERPL-MCNC: 88 MG/DL (ref 70–99)
HCT VFR BLD AUTO: 26.7 % (ref 35.8–46.3)
HGB BLD-MCNC: 8.9 G/DL (ref 11.7–15.4)
HGB RETIC QN AUTO: 38 PG (ref 29–35)
IMM GRANULOCYTES # BLD AUTO: 0.1 K/UL (ref 0–0.5)
IMM GRANULOCYTES NFR BLD AUTO: 1 % (ref 0–5)
IMM RETICS NFR: 10 % (ref 3–15.9)
KAPPA LC FREE SER-MCNC: 10.5 MG/L (ref 2.4–20.7)
KAPPA LC FREE/LAMBDA FREE SER: 1.2 (ref 0.2–0.8)
LAMBDA LC FREE SERPL-MCNC: 8.6 MG/L (ref 4.2–27.7)
LYMPHOCYTES # BLD: 1.8 K/UL (ref 0.5–4.6)
LYMPHOCYTES NFR BLD: 33 % (ref 13–44)
MCH RBC QN AUTO: 31.9 PG (ref 26.1–32.9)
MCHC RBC AUTO-ENTMCNC: 33.3 G/DL (ref 31.4–35)
MCV RBC AUTO: 95.7 FL (ref 82–102)
MONOCYTES # BLD: 0.2 K/UL (ref 0.1–1.3)
MONOCYTES NFR BLD: 4 % (ref 4–12)
NEUTS SEG # BLD: 3.3 K/UL (ref 1.7–8.2)
NEUTS SEG NFR BLD: 60 % (ref 43–78)
NRBC # BLD: 0 K/UL (ref 0–0.2)
PLATELET # BLD AUTO: 408 K/UL (ref 150–450)
PLATELET COMMENT: ADEQUATE
PMV BLD AUTO: 10.7 FL (ref 9.4–12.3)
POTASSIUM SERPL-SCNC: 4.3 MMOL/L (ref 3.5–5.1)
PROT SERPL-MCNC: 6.5 G/DL (ref 6.3–8.2)
RBC # BLD AUTO: 2.79 M/UL (ref 4.05–5.2)
RBC MORPH BLD: ABNORMAL
RBC MORPH BLD: ABNORMAL
RETICS # AUTO: 0.04 M/UL (ref 0.03–0.1)
RETICS/RBC NFR AUTO: 1.4 % (ref 0.3–2)
SODIUM SERPL-SCNC: 139 MMOL/L (ref 136–145)
WBC # BLD AUTO: 5.5 K/UL (ref 4.3–11.1)
WBC MORPH BLD: ABNORMAL

## 2024-08-29 PROCEDURE — 85046 RETICYTE/HGB CONCENTRATE: CPT

## 2024-08-29 PROCEDURE — 80053 COMPREHEN METABOLIC PANEL: CPT

## 2024-08-29 PROCEDURE — 84165 PROTEIN E-PHORESIS SERUM: CPT

## 2024-08-29 PROCEDURE — 88185 FLOWCYTOMETRY/TC ADD-ON: CPT

## 2024-08-29 PROCEDURE — 82306 VITAMIN D 25 HYDROXY: CPT

## 2024-08-29 PROCEDURE — 86334 IMMUNOFIX E-PHORESIS SERUM: CPT

## 2024-08-29 PROCEDURE — 36415 COLL VENOUS BLD VENIPUNCTURE: CPT

## 2024-08-29 PROCEDURE — 99215 OFFICE O/P EST HI 40 MIN: CPT | Performed by: PEDIATRICS

## 2024-08-29 PROCEDURE — 86304 IMMUNOASSAY TUMOR CA 125: CPT

## 2024-08-29 PROCEDURE — 83521 IG LIGHT CHAINS FREE EACH: CPT

## 2024-08-29 PROCEDURE — 85025 COMPLETE CBC W/AUTO DIFF WBC: CPT

## 2024-08-29 PROCEDURE — 83615 LACTATE (LD) (LDH) ENZYME: CPT

## 2024-08-29 PROCEDURE — 86160 COMPLEMENT ANTIGEN: CPT

## 2024-08-29 PROCEDURE — 82784 ASSAY IGA/IGD/IGG/IGM EACH: CPT

## 2024-08-29 PROCEDURE — 87798 DETECT AGENT NOS DNA AMP: CPT

## 2024-08-29 PROCEDURE — 82378 CARCINOEMBRYONIC ANTIGEN: CPT

## 2024-08-29 PROCEDURE — 83625 ASSAY OF LDH ENZYMES: CPT

## 2024-08-29 PROCEDURE — 86360 T CELL ABSOLUTE COUNT/RATIO: CPT

## 2024-08-29 PROCEDURE — 88184 FLOWCYTOMETRY/ TC 1 MARKER: CPT

## 2024-08-29 NOTE — PROGRESS NOTES
HISTORY OF PRESENT ILLNESS  Thaddeus Teran is a 24 y.o. y.o. female with hemolytic anemia    ABSTRACT  Reason for Referral: Severe anemia     Referring Provider: Rosalba Meyer MD     Primary Care Provider: CHHAYA Loo NP     Family History of Cancer/Hematologic Disorders: None documented.      Presenting Symptoms: Chronic shortness of breath, fatigue, chest pain, weakness, dizziness, headaches, nosebleeds, inguinal lymphadenopathy, nausea, vomiting, weight loss, and decreased appetite     Narrative with recent with Results/Procedures/Biopsies and Dates completed: Ms. Vásquez is a 23-year-old black female with a history of pineal gland cyst, migraines, auto immune disease, cholecystectomy, and orthopedic surgery. She was evaluated in consultation by Dr. Rosalba Meyer at [facility] on 8/9/22 for severe macrocytic anemia with HGB of 7.3. She reported symptoms of chronic shortness of breath, fatigue, chest pain, weakness, dizziness, headaches, nosebleeds, nausea, vomiting, weight loss, and decreased appetite. She denied any major bleeding and reported irregular menstrual periods. Extensive work-up for anemia was initiated. Labs showed positive urobilinogen, high bilirubin, high LDH, and low haptoglobin. She was started on 60 mg Prednisone for suspected hemolysis. PNH was negative, cold agglutinin was negative, direct Kimberly negative, multiple myeloma negative, hepatitis panel negative, HIV negative. Platelets and white count were normal. JESSICA was positive. CT of the abdomen and pelvis with contrast on 8/22/22 showed no major evidence of abnormalities in the liver or spleen or lymphadenopathy. Dr. Meyer noted that patient was most likely suffering from an autoimmune hemolytic anemia. With prednisone, patient's LDH, retic count, and bilirubin normalized; hemoglobin improved; and macrocytosis resolved. Prednisone was tapered down and then stopped in November of 2022.      Patient was lost to  s/p steroids, IVIG, ritux and brief cellcept with more significant response to ritux but relapses shortly after, and now with progression with anemia, s/p PRBC and increased ritux per dr cates and here for reeval  -again reviewed fully prior work up: all supercoombs x 2 neg; PNH neg; alps neg; RBC enzyme eval neg. Except for het g6pd not c/w clinical disease; h/o urticaria, so HAE work up showed [id number] inh low, will repeat with complement workup; elevated LFT in past, now resolved but concern of bryan, negative eye for K-F rings and negative ATPB7 genetic studies; negative hemolsys gene studies; negative CT CAP, recent incr. In soft mass lumbar area likely lipoma, MRI per darryn pending  -again rec. Splenectomy, pt. Refusing  -rec. Checking b cell status and if present cd 19, then weekly ritux x 4 then q 2 months  -cytoxan is next step, d/w patient fertility   -[place] [facility] [place]  -h/o pineal cyst, reviewed    Continue care with Dr cates, follow up here 6 months  -encourage splenectomy  -repeat C1 esterase inh and associated studies    Total time 50 min 50% in direct consultation about the patient's diagnosis and management      Syed Seo MD  Director, Adolescent Young Adult Cancer Care and Blood Disorders Program  [facility] Hematology/Oncology  [address] [address]  AY[employer] Phone           [employer] Hereditary Hemolytic Anemia Panel  Test code: 32572 ABCG5  ABCG8  ADA  AK1  ALAS2  ALDOA  ANK1  BPGM  X21rrr13  CD59  CDAN1  COL4A1  CYB5R3  EPB41        Syed Seo MD  Director, Adolescent Young Adult Cancer Care and Blood Disorders Program  [facility] Hematology/Oncology  [address] [address]  [employer] Phone

## 2024-08-29 NOTE — PATIENT INSTRUCTIONS
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  Complete the 24 hour urine and return it to our center within a day or 2 of completion.   Follow the verbal and written instructions discussed.     Dr Seo discussed with you the recommendation/option to have a splenectomy again today.     You are receiving Rituxan in [place] every 4 weeks per Dr Meyer.     MRI of your back as ordered per Dr Meyer (for the soft tissue mass).         Follow up in 6 months with Dr Seo     Treatment Summary has been discussed and given to patient: N/A      Current Labs:    Hospital Outpatient Visit on 08/29/2024   Component Date Value Ref Range Status    WBC 08/29/2024 5.5  4.3 - 11.1 K/uL Final    PERIPHERAL REVIEW TO FOLLOW    RBC 08/29/2024 2.79 (L)  4.05 - 5.2 M/uL Final    Hemoglobin 08/29/2024 8.9 (L)  11.7 - 15.4 g/dL Final    Hematocrit 08/29/2024 26.7 (L)  35.8 - 46.3 % Final    MCV 08/29/2024 95.7  82.0 - 102.0 FL Final    MCH 08/29/2024 31.9  26.1 - 32.9 PG Final    MCHC 08/29/2024 33.3  31.4 - 35.0 g/dL Final    RDW 08/29/2024 20.5 (H)  11.9 - 14.6 % Final    Platelets 08/29/2024 408  150 - 450 K/uL Final    MPV 08/29/2024 10.7  9.4 - 12.3 FL Final    nRBC 08/29/2024 0.00  0.0 - 0.2 K/uL Final    **Note: Absolute NRBC parameter is now reported with Hemogram**    Neutrophils % 08/29/2024 60  43 - 78 % Final    Lymphocytes % 08/29/2024 33  13 - 44 % Final    Monocytes % 08/29/2024 4  4.0 - 12.0 % Final    Eosinophils % 08/29/2024 2  0.5 - 7.8 % Final    Basophils % 08/29/2024 0  0.0 - 2.0 % Final    Immature Granulocytes % 08/29/2024 1  0.0  - 5.0 % Final    Neutrophils Absolute 08/29/2024 3.3  1.7 - 8.2 K/UL Final    Lymphocytes Absolute 08/29/2024 1.8  0.5 - 4.6 K/UL Final    Monocytes Absolute 08/29/2024 0.2  0.1 - 1.3 K/UL Final    Eosinophils Absolute 08/29/2024 0.1  0.0 - 0.8 K/UL Final    Basophils Absolute 08/29/2024 0.0  0.0 - 0.2 K/UL Final    Immature Granulocytes Absolute 08/29/2024 0.1  0.0 - 0.5 K/UL Final    RBC Comment 08/29/2024     Final                    Value:SLIGHT  ANISOCYTOSIS + POIKILOCYTOSIS      RBC Comment 08/29/2024     Final                    Value:SLIGHT  HYPOCHROMIA      WBC Comment 08/29/2024 Result Confirmed By Smear    Final    Platelet Comment 08/29/2024 ADEQUATE    Final    Differential Type 08/29/2024 AUTOMATED    Final    Sodium 08/29/2024 139  136 - 145 mmol/L Final    Potassium 08/29/2024 4.3  3.5 - 5.1 mmol/L Final    Chloride 08/29/2024 106  98 - 107 mmol/L Final    CO2 08/29/2024 24  20 - 28 mmol/L Final    Anion Gap 08/29/2024 9  9 - 18 mmol/L Final    Glucose 08/29/2024 88  70 - 99 mg/dL Final    Comment: <70 mg/dL Consistent with, but not fully diagnostic of hypoglycemia.  100 - 125 mg/dL Impaired fasting glucose/consistent with pre-diabetes mellitus.  > 126 mg/dl Fasting glucose consistent with overt diabetes mellitus      BUN 08/29/2024 10  6 - 23 MG/DL Final    Creatinine 08/29/2024 0.46 (L)  0.60 - 1.10 MG/DL Final    Est, Glom Filt Rate 08/29/2024 >90  >60 ml/min/1.73m2 Final    Comment:    Pediatric calculator link: https://www.kidney.org/professionals/kdoqi/gfr_calculatorped     These results are not intended for use in patients <18 years of age.     eGFR results are calculated without a race factor using  the 2021 CKD-EPI equation. Careful clinical correlation is recommended, particularly when comparing to results calculated using previous equations.  The CKD-EPI equation is less accurate in patients with extremes of muscle mass, extra-renal metabolism of creatinine, excessive creatine ingestion,

## 2024-08-30 LAB
BASOPHILS # BLD AUTO: 0 X10E3/UL (ref 0–0.2)
BASOPHILS NFR BLD AUTO: 0 %
CD3+CD4+ CELLS # BLD: ABNORMAL /UL
CD3+CD4+ CELLS NFR BLD: ABNORMAL %
CD3+CD4+ CELLS/CD3+CD8+ CLL BLD: ABNORMAL
CD3+CD8+ CELLS # BLD: ABNORMAL /UL
CD3+CD8+ CELLS NFR BLD: ABNORMAL %
CEA SERPL-MCNC: 3.3 NG/ML (ref 0–3.8)
EOSINOPHIL # BLD AUTO: 0.1 X10E3/UL (ref 0–0.4)
EOSINOPHIL NFR BLD AUTO: 1 %
ERYTHROCYTE [DISTWIDTH] IN BLOOD BY AUTOMATED COUNT: 19.7 % (ref 11.7–15.4)
HCT VFR BLD AUTO: 28 % (ref 34–46.6)
HGB BLD-MCNC: 9.7 G/DL (ref 11.1–15.9)
IMM GRANULOCYTES # BLD: 0 X10E3/UL (ref 0–0.1)
IMM GRANULOCYTES NFR BLD: 1 %
LYMPHOCYTES # BLD AUTO: 1.9 X10E3/UL (ref 0.7–3.1)
LYMPHOCYTES NFR BLD AUTO: 37 %
MCH RBC QN AUTO: 32.6 PG (ref 26.6–33)
MCHC RBC AUTO-ENTMCNC: 34.6 G/DL (ref 31.5–35.7)
MCV RBC AUTO: 94 FL (ref 79–97)
MONOCYTES # BLD AUTO: 0.2 X10E3/UL (ref 0.1–0.9)
MONOCYTES NFR BLD AUTO: 4 %
NEUTROPHILS # BLD AUTO: 3 X10E3/UL (ref 1.4–7)
NEUTROPHILS NFR BLD AUTO: 57 %
PLATELET # BLD AUTO: 457 X10E3/UL (ref 150–450)
RBC # BLD AUTO: 2.98 X10E6/UL (ref 3.77–5.28)
WBC # BLD AUTO: 5.3 X10E3/UL (ref 3.4–10.8)

## 2024-08-31 LAB — B19V DNA SPEC QL NAA+PROBE: NEGATIVE

## 2024-09-01 LAB
LDH SERPL-CCNC: 246 IU/L (ref 119–226)
LDH1 CFR SERPL ELPH: 46 % (ref 17–32)
LDH2 CFR SERPL ELPH: 36 % (ref 25–40)
LDH3 CFR SERPL ELPH: 14 % (ref 17–27)
LDH4 CFR SERPL ELPH: 3 % (ref 5–13)
LDH5 CFR SERPL ELPH: 1 % (ref 4–20)

## 2024-09-03 DIAGNOSIS — N63.10 MASS OF RIGHT BREAST, UNSPECIFIED QUADRANT: Primary | ICD-10-CM

## 2024-09-03 LAB
ALBUMIN SERPL ELPH-MCNC: 4.5 G/DL (ref 2.9–4.4)
ALBUMIN/GLOB SERPL: 1.9 (ref 0.7–1.7)
ALPHA1 GLOB SERPL ELPH-MCNC: 0.3 G/DL (ref 0–0.4)
ALPHA2 GLOB SERPL ELPH-MCNC: 0.5 G/DL (ref 0.4–1)
B-GLOBULIN SERPL ELPH-MCNC: 0.8 G/DL (ref 0.7–1.3)
GAMMA GLOB SERPL ELPH-MCNC: 0.8 G/DL (ref 0.4–1.8)
GLOBULIN SER-MCNC: 2.4 G/DL (ref 2.2–3.9)
IGA SERPL-MCNC: 143 MG/DL (ref 87–352)
IGG SERPL-MCNC: 827 MG/DL (ref 586–1602)
IGM SERPL-MCNC: 33 MG/DL (ref 26–217)
INTERPRETATION SERPL IEP-IMP: ABNORMAL
M PROTEIN SERPL ELPH-MCNC: ABNORMAL G/DL
PROT SERPL-MCNC: 6.9 G/DL (ref 6–8.5)

## 2024-09-04 LAB
C1Q SERPL-MCNC: 14 MG/DL (ref 10.3–20.5)
CD59 CELLS BLD QL: NORMAL

## (undated) DEVICE — STERILE PVP: Brand: MEDLINE INDUSTRIES, INC.

## (undated) DEVICE — GLOVE ORANGE PI 7 1/2   MSG9075

## (undated) DEVICE — SYRINGE MED 10ML LUERLOCK TIP W/O SFTY DISP

## (undated) DEVICE — SAFE T T + T HNDL JAMSH NDL T HNDL IL NDL MAR ACQUISITION

## (undated) DEVICE — GAUZE,SPONGE,2"X2",8PLY,STERILE,LF,2'S: Brand: MEDLINE

## (undated) DEVICE — 3M™ TEGADERM™ TRANSPARENT FILM DRESSING FRAME STYLE, 1624W, 2-3/8 IN X 2-3/4 IN (6 CM X 7 CM), 100/CT 4CT/CASE: Brand: 3M™ TEGADERM™

## (undated) DEVICE — NEEDLE SPNL 22GA L3.5IN BLK HUB S STL REG WALL FIT STYL W/